# Patient Record
Sex: FEMALE | Race: WHITE
[De-identification: names, ages, dates, MRNs, and addresses within clinical notes are randomized per-mention and may not be internally consistent; named-entity substitution may affect disease eponyms.]

---

## 2017-10-05 ENCOUNTER — HOSPITAL ENCOUNTER (INPATIENT)
Dept: HOSPITAL 62 - ER | Age: 37
LOS: 3 days | Discharge: HOME | DRG: 690 | End: 2017-10-08
Attending: INTERNAL MEDICINE | Admitting: INTERNAL MEDICINE
Payer: MEDICAID

## 2017-10-05 DIAGNOSIS — Z82.49: ICD-10-CM

## 2017-10-05 DIAGNOSIS — N20.0: ICD-10-CM

## 2017-10-05 DIAGNOSIS — N12: Primary | ICD-10-CM

## 2017-10-05 DIAGNOSIS — F17.210: ICD-10-CM

## 2017-10-05 DIAGNOSIS — Z86.73: ICD-10-CM

## 2017-10-05 DIAGNOSIS — B96.20: ICD-10-CM

## 2017-10-05 DIAGNOSIS — Z80.9: ICD-10-CM

## 2017-10-05 DIAGNOSIS — Z87.442: ICD-10-CM

## 2017-10-05 DIAGNOSIS — Z83.3: ICD-10-CM

## 2017-10-05 LAB
ADD ON TESTING BLD IN LAB: (no result)
ALBUMIN SERPL-MCNC: 4.6 G/DL (ref 3.5–5)
ALP SERPL-CCNC: 85 U/L (ref 38–126)
ALT SERPL-CCNC: 33 U/L (ref 9–52)
ANION GAP SERPL CALC-SCNC: 14 MMOL/L (ref 5–19)
APPEARANCE UR: (no result)
AST SERPL-CCNC: 18 U/L (ref 14–36)
BASOPHILS NFR BLD MANUAL: 0 % (ref 0–2)
BILIRUB DIRECT SERPL-MCNC: 0.3 MG/DL (ref 0–0.4)
BILIRUB SERPL-MCNC: 0.4 MG/DL (ref 0.2–1.3)
BILIRUB UR QL STRIP: NEGATIVE
BUN SERPL-MCNC: 12 MG/DL (ref 7–20)
CALCIUM: 10.2 MG/DL (ref 8.4–10.2)
CHLORIDE SERPL-SCNC: 102 MMOL/L (ref 98–107)
CO2 SERPL-SCNC: 26 MMOL/L (ref 22–30)
CREAT SERPL-MCNC: 0.78 MG/DL (ref 0.52–1.25)
EOSINOPHIL NFR BLD MANUAL: 0 % (ref 0–6)
ERYTHROCYTE [DISTWIDTH] IN BLOOD BY AUTOMATED COUNT: 13.1 % (ref 11.5–14)
GLUCOSE SERPL-MCNC: 88 MG/DL (ref 75–110)
GLUCOSE UR STRIP-MCNC: NEGATIVE MG/DL
HCT VFR BLD CALC: 40.1 % (ref 36–47)
HGB BLD-MCNC: 13.9 G/DL (ref 12–15.5)
HGB HCT DIFFERENCE: 1.6
KETONES UR STRIP-MCNC: (no result) MG/DL
MCH RBC QN AUTO: 31.1 PG (ref 27–33.4)
MCHC RBC AUTO-ENTMCNC: 34.7 G/DL (ref 32–36)
MCV RBC AUTO: 90 FL (ref 80–97)
NEUTS BAND NFR BLD MANUAL: 1 % (ref 3–5)
NITRITE UR QL STRIP: NEGATIVE
PH UR STRIP: 5 [PH] (ref 5–9)
POTASSIUM SERPL-SCNC: 4 MMOL/L (ref 3.6–5)
PROT SERPL-MCNC: 7.5 G/DL (ref 6.3–8.2)
PROT UR STRIP-MCNC: 30 MG/DL
RBC # BLD AUTO: 4.48 10^6/UL (ref 3.72–5.28)
RBC MORPH BLD: (no result)
SODIUM SERPL-SCNC: 141.6 MMOL/L (ref 137–145)
SP GR UR STRIP: 1.01
TOTAL CELLS COUNTED BLD: 100
TOXIC GRANULES BLD QL SMEAR: (no result)
UROBILINOGEN UR-MCNC: NEGATIVE MG/DL (ref ?–2)
VARIANT LYMPHS NFR BLD MANUAL: 3 % (ref 13–45)
WBC # BLD AUTO: 22 10^3/UL (ref 4–10.5)
WBC TOXIC VACUOLES BLD QL SMEAR: PRESENT

## 2017-10-05 PROCEDURE — 85025 COMPLETE CBC W/AUTO DIFF WBC: CPT

## 2017-10-05 PROCEDURE — 87086 URINE CULTURE/COLONY COUNT: CPT

## 2017-10-05 PROCEDURE — 81001 URINALYSIS AUTO W/SCOPE: CPT

## 2017-10-05 PROCEDURE — 84703 CHORIONIC GONADOTROPIN ASSAY: CPT

## 2017-10-05 PROCEDURE — 87040 BLOOD CULTURE FOR BACTERIA: CPT

## 2017-10-05 PROCEDURE — 80076 HEPATIC FUNCTION PANEL: CPT

## 2017-10-05 PROCEDURE — 74000: CPT

## 2017-10-05 PROCEDURE — 87088 URINE BACTERIA CULTURE: CPT

## 2017-10-05 PROCEDURE — 99284 EMERGENCY DEPT VISIT MOD MDM: CPT

## 2017-10-05 PROCEDURE — 80048 BASIC METABOLIC PNL TOTAL CA: CPT

## 2017-10-05 PROCEDURE — 87186 SC STD MICRODIL/AGAR DIL: CPT

## 2017-10-05 PROCEDURE — 36415 COLL VENOUS BLD VENIPUNCTURE: CPT

## 2017-10-05 PROCEDURE — 76775 US EXAM ABDO BACK WALL LIM: CPT

## 2017-10-05 RX ADMIN — OXYCODONE AND ACETAMINOPHEN PRN TAB: 5; 325 TABLET ORAL at 15:59

## 2017-10-05 RX ADMIN — SODIUM CHLORIDE PRN ML: 9 INJECTION, SOLUTION INTRAVENOUS at 12:34

## 2017-10-05 RX ADMIN — OXYCODONE AND ACETAMINOPHEN PRN TAB: 5; 325 TABLET ORAL at 22:56

## 2017-10-05 RX ADMIN — OXYCODONE AND ACETAMINOPHEN PRN TAB: 5; 325 TABLET ORAL at 09:13

## 2017-10-05 RX ADMIN — ACETAMINOPHEN PRN MG: 325 TABLET ORAL at 12:35

## 2017-10-05 RX ADMIN — DOCUSATE SODIUM SCH MG: 100 CAPSULE, LIQUID FILLED ORAL at 17:32

## 2017-10-05 RX ADMIN — ENOXAPARIN SODIUM SCH MG: 40 INJECTION SUBCUTANEOUS at 09:14

## 2017-10-05 RX ADMIN — DOCUSATE SODIUM SCH MG: 100 CAPSULE, LIQUID FILLED ORAL at 09:13

## 2017-10-05 RX ADMIN — SODIUM CHLORIDE PRN ML: 9 INJECTION, SOLUTION INTRAVENOUS at 09:15

## 2017-10-05 NOTE — RADIOLOGY REPORT (SQ)
EXAM DESCRIPTION: 



KUB/ABDOMEN (SINGLE VIEW)



CLINICAL HISTORY: 



37 years, Female, passing left sided kidney stone?



COMPARISON:

December 9, 2016 radiograph



NUMBER OF VIEWS:

1



TECHNIQUE:

Routine abdomen radiograph technique.



LIMITATIONS:

None.



FINDINGS:



No bowel obstruction or free peritoneal gas. 10 mm calcified

stone probably in the left renal pelvis. No calcified stones

along the expected course of either ureter. Numerous probable

phleboliths in the lower pelvis bilaterally.



IMPRESSION:



10 mm calcified stone probably in the left renal pelvis.



No bowel obstruction or free peritoneal gas on this single view.

 



 2011 [employer]- All Rights Reserved

## 2017-10-05 NOTE — ER DOCUMENT REPORT
ED GI/





- General


Chief Complaint: Possible Kidney Stone


Stated Complaint: POSSIBLE KIDNEY STONE


Time Seen by Provider: 10/05/17 03:48


Notes: 


Patient is a 37-year-old female comes emergency department for chief complaint 

of sudden onset of left flank pain this evening.  She does have a history of 

kidney stones and has passed them in the past, she has also had kidney 

infections.  She denies fever, she states she vomited once.  She denies 

abdominal pain.  She denies any daily medications or surgeries.


TRAVEL OUTSIDE OF THE [place] IN LAST 30 DAYS: No





- Related Data


Allergies/Adverse Reactions: 


 





cinnamon [Cinnamon] Allergy (Unknown, Verified 05/10/16 17:23)


 











Past Medical History





- General


Information source: Patient





- Social History


Smoking Status: Never Smoker


Frequency of alcohol use: None


Drug Abuse: None


Lives with: Family


Family History: DM, Malignancy, Thyroid Disfunction


Patient has suicidal ideation: No


Patient has homicidal ideation: No


Renal/ Medical History: Reports: Hx Kidney Stones.  Denies: Hx Peritoneal 

Dialysis


Past Surgical History: Reports: Hx Kidney (Renal Surgery) - adrenal gland 

removal left





- Immunizations


Immunizations up to date: Yes


Hx Diphtheria, Pertussis, Tetanus Vaccination: Yes - less 5 yrs





Review of Systems





- Review of Systems


Constitutional: No symptoms reported


EENT: No symptoms reported


Cardiovascular: No symptoms reported


Respiratory: No symptoms reported


Gastrointestinal: See HPI


Genitourinary: See HPI


Female Genitourinary: See HPI


Musculoskeletal: No symptoms reported


Skin: No symptoms reported


Hematologic/Lymphatic: No symptoms reported


Neurological/Psychological: No symptoms reported





Physical Exam





- Vital signs


Vitals: 


 











Temp Pulse Resp BP Pulse Ox


 


 98.1 F   125 H  22 H  131/74 H  97 


 


 10/05/17 03:38  10/05/17 03:38  10/05/17 03:38  10/05/17 03:38  10/05/17 03:38











Interpretation: Normal





- General


General appearance: Alert, Anxious


In distress: Mild - Patient appears to be mildly uncomfortable although she 

does not appear to be in severe distress





- HEENT


Head: Normocephalic, Atraumatic


Eyes: Normal


Extraocular movements intact: Yes


Eyelashes: Normal


Pupils: PERRL


Mucous membranes: Normal


Pharynx: Normal


Neck: Normal





- Respiratory


Respiratory status: No respiratory distress


Chest status: Nontender


Breath sounds: Normal


Chest palpation: Normal





- Cardiovascular


Rhythm: Regular, Tachycardia


Heart sounds: Normal auscultation, S1 appreciated, S2 appreciated


Murmur: No





- Abdominal


Inspection: Normal


Distension: No distension


Bowel sounds: Normal


Tenderness: Nontender.  No: Tender, Guarding


Organomegaly: No organomegaly





- Back


Back: Tender - Minimal tenderness over the left CVA area, no overt tenderness





- Extremities


General upper extremity: Normal inspection, Nontender, Normal color, Normal ROM

, Normal temperature


General lower extremity: Normal inspection, Nontender, Normal color, Normal ROM

, Normal temperature, Normal weight bearing.  No: Shabnam's sign





- Neurological


Neuro grossly intact: Yes


Cognition: Normal


Orientation: AAOx4


[place] Coma Scale Eye Opening: Spontaneous


[place] Coma Scale Verbal: Oriented


Estrella Coma Scale Motor: Obeys Commands


Estrella Coma Scale Total: 15


Speech: Normal


Motor strength normal: LUE, RUE, LLE, RLE


Sensory: Normal





- Psychological


Associated symptoms: Normal affect, Normal mood





- Skin


Skin Temperature: Warm


Skin Moisture: Dry


Skin Color: Normal





Course





- Re-evaluation


Re-evalutation: 


Patient uncomfortable on initial evaluation but does not appear to be in severe 

distress.  She is only vomited once.  Abdominal and flank examination is 

unremarkable.  Patient is tachycardic but she is not febrile or hypotensive.  

Workup pending.





KUB performed, compared to prior shows no significant change in the location of 

the stone, this stone has been in this location for at least 2 years per 

imaging performed in this department.  Leukocytosis at 22,000 with elevation of 

neutrophils, Urinalysis indicates infection, workup is consistent with 

pyelonephritis which patient has had repeatedly with this stone in the left 

kidney.  We do not have urology coverage for 5 more days.





10/05/17 06:49


Spoke with Dr. Zambrano, [facility], he recommends that 

patient be admitted to the hospital under hospitalist service, he states that 

after patient has been treated for the pyelonephritis she can follow up in 

about 2 weeks with Urology and that she must follow up with Urology at that 

point.  Discussed this with patient and she states agreement.





Discussed with Dr. Gregory, patient will be admitted to the hospital. 








- Vital Signs


Vital signs: 


 











Temp Pulse Resp BP Pulse Ox


 


 98.3 F   111 H  18   114/60   94 


 


 10/05/17 06:11  10/05/17 06:11  10/05/17 06:11  10/05/17 06:11  10/05/17 06:11














- Laboratory


Result Diagrams: 


 10/05/17 04:10





 10/05/17 04:10


Laboratory results interpreted by me: 


 











  10/05/17 10/05/17





  04:10 04:10


 


WBC  22.0 H 


 


Seg Neuts % (Manual)  82 H 


 


Band Neutrophils %  1 L 


 


Lymphocytes % (Manual)  3 L 


 


Abs Neuts (Manual)  18.3 H 


 


Abs Monocytes (Manual)  2.9 H 


 


Urine Protein   30 H


 


Urine Ketones   TRACE H


 


Urine Blood   MODERATE H


 


Ur Leukocyte Esterase   MODERATE H














Discharge





- Discharge


Clinical Impression: 


 Pyelonephritis, Tachycardia





Leukocytosis


Qualifiers:


 Leukocytosis type: unspecified Qualified Code(s): D72.829 - Elevated white 

blood cell count, unspecified





Condition: Stable


Disposition: ADMITTED AS INPATIENT


Admitting Provider: Hospitalist


Unit Admitted: Telemetry

## 2017-10-05 NOTE — PDOC H&P
History of Present Illness


Admission Date/PCP: 


  10/05/17 07:39





  





Patient complains of: Back pain


History of Present Illness: 


KAMLESH GARCIA is a 37 year old female, with history of kidney stone in the 

past presents to the emergency room because of back pain started last night 

associated chills, nausea and vomiting.  There is no fever nor sweating.  No 

noted hematuria nor diarrhea.  Likewise there is no constipation nor vaginal 

bleeding nor drainage.  No melena hematochezia noted as well.  Patient went to 

the emergency room for evaluation WBC was elevated, KUB showed no obstruction, 

urinalysis was abnormal.  She had CVA tenderness on the left.  Urologist was 

consulted recommended treatment for pyelonephritis prior to any urologic 

intervention and therefore the patient was referred for admission.








Past Medical History


Renal/ Medical History: Reports: Nephrolithiasis





Past Surgical History


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





Social History


Information Source: Patient


Lives with: Family


Smoking Status: Current Some Day Smoker


Frequency of Alcohol Use: Rare


Hx Recreational Drug Use: No


Drugs: None





Family History


Family History: CVA, DM, Hypertension, Malignancy, Thyroid Disfunction, Other - 

Heart disease


Parental Family History Reviewed: Yes


Children Family History Reviewed: Yes


Sibling(s) Family History Reviewed.: Yes





Medication/Allergy


Home Medications: 








Levofloxacin [Levaquin 750 mg Tablet] 750 mg PO DAILY #7 tablet 05/10/16 


Oxycodone HCl/Acetaminophen [Percocet 5-325 mg Tablet] 1 - 2 tab PO Q4H PRN #15 

tablet 05/10/16 


Promethazine HCl [Phenergan 25 mg Tablet] 1 - 2 tab PO Q6H PRN #15 tablet 05/10/

16 


Ciprofloxacin HCl [Cipro 500 mg Tablet] 500 mg PO BID #20 tablet 12/09/16 


Hydrocodone/Acetaminophen [Norco 5-325 mg Tablet] 1 tab PO Q4 PRN #15 tablet 12/ 09/16 


Phenazopyridine HCl [Pyridium 100 Mg Tablet] 100 mg PO Q8 PRN #30 tablet 12/09/ 16 








Allergies/Adverse Reactions: 


 





cinnamon [Cinnamon] Allergy (Unknown, Verified 05/10/16 17:23)


 











Review of Systems


Constitutional: PRESENT: chills.  ABSENT: fever(s), headache(s), weight gain, 

weight loss


Eyes: ABSENT: visual disturbances


Ears: ABSENT: hearing changes


Cardiovascular: ABSENT: chest pain, dyspnea on exertion, edema, orthropnea, 

palpitations


Respiratory: ABSENT: cough, hemoptysis


Gastrointestinal: PRESENT: abdominal pain - Left flank, nausea, vomiting.  

ABSENT: coffee ground emesis, constipation, diarrhea, hematemesis, hematochezia

, melena


Genitourinary: ABSENT: difficulty urinating, dysuria, hematuria


Musculoskeletal: ABSENT: joint swelling


Integumentary: ABSENT: pruritus, rash, wounds


Neurological: ABSENT: abnormal gait, abnormal speech, confusion, dizziness, 

focal weakness, syncope


Psychiatric: ABSENT: anxiety, depression, homidical ideation, suicidal ideation


Endocrine: ABSENT: cold intolerance, heat intolerance, polydipsia, polyuria


Hematologic/Lymphatic: ABSENT: easy bleeding, easy bruising





Physical Exam


Vital Signs: 


 











Temp Pulse Resp BP Pulse Ox


 


 98.3 F   111 H  18   114/60   94 


 


 10/05/17 06:11  10/05/17 06:11  10/05/17 06:11  10/05/17 06:11  10/05/17 06:11











General appearance: PRESENT: no acute distress, obese


Head exam: PRESENT: atraumatic, normocephalic


Eye exam: PRESENT: conjunctiva pink, EOMI, PERRLA.  ABSENT: scleral icterus


Ear exam: PRESENT: normal external ear exam


Mouth exam: PRESENT: moist, neck supple, tongue midline


Neck exam: ABSENT: carotid bruit, JVD, lymphadenopathy, thyromegaly


Respiratory exam: PRESENT: clear to auscultation neeraj.  ABSENT: rales, rhonchi, 

wheezes


Cardiovascular exam: PRESENT: RRR.  ABSENT: diastolic murmur, rubs, systolic 

murmur


Pulses: PRESENT: normal dorsalis pedis pul


Vascular exam: PRESENT: normal capillary refill


GI/Abdominal exam: PRESENT: normal bowel sounds, soft, tenderness - CVA, noted 

on the left side.  ABSENT: distended, guarding, mass, organolmegaly, rebound


Rectal exam: PRESENT: deferred


Extremities exam: PRESENT: full ROM.  ABSENT: calf tenderness, clubbing, pedal 

edema


Neurological exam: PRESENT: alert, awake, oriented to person, oriented to place

, oriented to time, oriented to situation


Psychiatric exam: PRESENT: appropriate affect, normal mood.  ABSENT: homicidal 

ideation, suicidal ideation


Skin exam: PRESENT: dry, intact, warm.  ABSENT: cyanosis, rash





Results


Impressions: 


 





KUB X-Ray  10/05/17 05:12


IMPRESSION:


 


10 mm calcified stone probably in the left renal pelvis.


 


No bowel obstruction or free peritoneal gas on this single view.


 


 


 2011 [employer]- All Rights Reserved


 














Assessment & Plan





- Diagnosis


(1) Pyelonephritis


Is this a current diagnosis for this admission?: Yes   





(2) Nephrolithiasis


Is this a current diagnosis for this admission?: Yes   





- Time


Time Spent: 30 to 50 Minutes





- Inpatient Certification


Based on my medical assessment, after consideration of the patient's 

comorbidities, presenting symptoms, or acuity I expect that the services needed 

warrant INPATIENT care.: Yes


I certify that my determination is in accordance with my understanding of 

Medicare's requirements for reasonable and necessary INPATIENT services [42 CFR 

412.3e].: Yes


Medical Necessity: Need For IV Fluids, Need for IV Antibiotics


Post Hospital Care: D/C Planner Documentation





- Plan Summary


Plan Summary: 





Patient to be admitted to medical floor.  We will hydrate the patient with 

normal saline, dry on full liquid diet, culture the blood in the urine and 

begin intravenous antibiotic with Invanz.  We will obtain a renal ultrasound.  

DVT prophylaxis with Lovenox will be placed.  Further testing depends on the 

initial evaluations outlined above.

## 2017-10-05 NOTE — RADIOLOGY REPORT (SQ)
EXAM DESCRIPTION:  U/S RETROPERITON LTD



COMPLETED DATE/TIME:  10/5/2017 6:44 pm



REASON FOR STUDY:  hydronephrosis, back pain, kidney stone



COMPARISON:  None.



TECHNIQUE:  Dynamic and static grayscale images acquired of the kidneys and bladder and recorded on P
ACS. Additional selected color Doppler and spectral images recorded.



LIMITATIONS:  None.



FINDINGS:  RIGHT KIDNEY:  Normal size.   Normal echogenicity.   No solid or suspicious masses.   No h
ydronephrosis.   Small parenchymal calcifications.

LEFT KIDNEY:  Normal size.   Normal echogenicity.   No solid or suspicious masses.   No hydronephrosi
s.   Small parenchymal calcifications.

BLADDER: Not visualized due to bowel gas.

OTHER FINDINGS: Fatty infiltration of the liver.



IMPRESSION:  No hydronephrosis.  Bilateral nephrolithiasis.  Fatty liver.



TECHNICAL DOCUMENTATION:  JOB ID:  [id number]

 2011 [employer]- All Rights Reserved

## 2017-10-06 LAB
ANION GAP SERPL CALC-SCNC: 8 MMOL/L (ref 5–19)
BASOPHILS # BLD AUTO: 0 10^3/UL (ref 0–0.2)
BASOPHILS NFR BLD AUTO: 0.4 % (ref 0–2)
BUN SERPL-MCNC: 15 MG/DL (ref 7–20)
CALCIUM: 8.6 MG/DL (ref 8.4–10.2)
CHLORIDE SERPL-SCNC: 103 MMOL/L (ref 98–107)
CO2 SERPL-SCNC: 25 MMOL/L (ref 22–30)
CREAT SERPL-MCNC: 0.8 MG/DL (ref 0.52–1.25)
EOSINOPHIL # BLD AUTO: 0.1 10^3/UL (ref 0–0.6)
EOSINOPHIL NFR BLD AUTO: 1 % (ref 0–6)
ERYTHROCYTE [DISTWIDTH] IN BLOOD BY AUTOMATED COUNT: 13.4 % (ref 11.5–14)
GLUCOSE SERPL-MCNC: 86 MG/DL (ref 75–110)
HCT VFR BLD CALC: 34 % (ref 36–47)
HGB BLD-MCNC: 11.3 G/DL (ref 12–15.5)
HGB HCT DIFFERENCE: -0.1
LYMPHOCYTES # BLD AUTO: 1.4 10^3/UL (ref 0.5–4.7)
LYMPHOCYTES NFR BLD AUTO: 12.1 % (ref 13–45)
MCH RBC QN AUTO: 30.2 PG (ref 27–33.4)
MCHC RBC AUTO-ENTMCNC: 33.2 G/DL (ref 32–36)
MCV RBC AUTO: 91 FL (ref 80–97)
MONOCYTES # BLD AUTO: 1.7 10^3/UL (ref 0.1–1.4)
MONOCYTES NFR BLD AUTO: 14.2 % (ref 3–13)
NEUTROPHILS # BLD AUTO: 8.4 10^3/UL (ref 1.7–8.2)
NEUTS SEG NFR BLD AUTO: 72.3 % (ref 42–78)
POTASSIUM SERPL-SCNC: 4.4 MMOL/L (ref 3.6–5)
RBC # BLD AUTO: 3.75 10^6/UL (ref 3.72–5.28)
SODIUM SERPL-SCNC: 136.2 MMOL/L (ref 137–145)
WBC # BLD AUTO: 11.7 10^3/UL (ref 4–10.5)

## 2017-10-06 RX ADMIN — ACETAMINOPHEN PRN MG: 325 TABLET ORAL at 02:39

## 2017-10-06 RX ADMIN — DOCUSATE SODIUM SCH MG: 100 CAPSULE, LIQUID FILLED ORAL at 10:23

## 2017-10-06 RX ADMIN — DOCUSATE SODIUM SCH MG: 100 CAPSULE, LIQUID FILLED ORAL at 18:33

## 2017-10-06 RX ADMIN — MORPHINE SULFATE PRN MG: 10 INJECTION INTRAMUSCULAR; INTRAVENOUS; SUBCUTANEOUS at 02:36

## 2017-10-06 RX ADMIN — MORPHINE SULFATE PRN MG: 10 INJECTION INTRAMUSCULAR; INTRAVENOUS; SUBCUTANEOUS at 07:30

## 2017-10-06 RX ADMIN — CYCLOBENZAPRINE HYDROCHLORIDE SCH MG: 10 TABLET, FILM COATED ORAL at 16:01

## 2017-10-06 RX ADMIN — ERTAPENEM SODIUM SCH GM: 1 INJECTION, POWDER, LYOPHILIZED, FOR SOLUTION INTRAMUSCULAR; INTRAVENOUS at 10:23

## 2017-10-06 RX ADMIN — ACETAMINOPHEN PRN MG: 325 TABLET ORAL at 08:42

## 2017-10-06 RX ADMIN — MORPHINE SULFATE PRN MG: 10 INJECTION INTRAMUSCULAR; INTRAVENOUS; SUBCUTANEOUS at 00:13

## 2017-10-06 RX ADMIN — ENOXAPARIN SODIUM SCH MG: 40 INJECTION SUBCUTANEOUS at 10:23

## 2017-10-06 RX ADMIN — ACETAMINOPHEN PRN MG: 325 TABLET ORAL at 19:58

## 2017-10-06 RX ADMIN — OXYCODONE HYDROCHLORIDE PRN MG: 5 TABLET ORAL at 08:43

## 2017-10-06 NOTE — PDOC PROGRESS REPORT
Subjective


Progress Note for:: 10/06/17


Subjective:: 





Tolerating oral intake well. No reported chills nor fever. No diarrhea, N/V. No 

SOB. Back pain persist.





Physical Exam


Vital Signs: 


 











Temp Pulse Resp BP Pulse Ox


 


 97.7 F   70   19   121/55 L  98 


 


 10/06/17 03:50  10/06/17 03:50  10/06/17 03:50  10/06/17 03:50  10/06/17 03:50








 Intake & Output











 10/05/17 10/06/17 10/07/17





 06:59 06:59 06:59


 


Intake Total  1080 


 


Output Total  400 


 


Balance  680 


 


Weight  104.9 kg 











General appearance: PRESENT: no acute distress, cooperative


Head exam: PRESENT: normocephalic


Eye exam: PRESENT: EOMI


Mouth exam: PRESENT: moist, neck supple


Neck exam: ABSENT: JVD


Respiratory exam: PRESENT: clear to auscultation neeraj.  ABSENT: rhonchi, wheezes


Cardiovascular exam: PRESENT: RRR.  ABSENT: gallop


GI/Abdominal exam: PRESENT: soft.  ABSENT: distended


Extremities exam: ABSENT: pedal edema


Neurological exam: PRESENT: alert, awake, oriented to situation


Skin exam: PRESENT: dry, warm.  ABSENT: cyanosis





Results


Laboratory Results: 


 





 10/06/17 05:46 





 10/06/17 05:46 





 











  10/06/17 10/06/17





  05:46 05:46


 


WBC  11.7 H 


 


RBC  3.75 


 


Hgb  11.3 L D 


 


Hct  34.0 L 


 


MCV  91 


 


MCH  30.2 


 


MCHC  33.2 


 


RDW  13.4 


 


Plt Count  216 


 


Seg Neutrophils %  72.3 


 


Lymphocytes %  12.1 L 


 


Monocytes %  14.2 H 


 


Eosinophils %  1.0 


 


Basophils %  0.4 


 


Absolute Neutrophils  8.4 H 


 


Absolute Lymphocytes  1.4 


 


Absolute Monocytes  1.7 H 


 


Absolute Eosinophils  0.1 


 


Absolute Basophils  0.0 


 


Sodium   136.2 L


 


Potassium   4.4


 


Chloride   103


 


Carbon Dioxide   25


 


Anion Gap   8


 


BUN   15


 


Creatinine   0.80


 


Est GFR ( Amer)   > 60


 


Est GFR (Non-Af Amer)   > 60


 


Glucose   86


 


Calcium   8.6











Impressions: 


 





Renal Ultrasound  10/05/17 00:00


IMPRESSION:  No hydronephrosis.  Bilateral nephrolithiasis.  Fatty liver.


 








KUB X-Ray  10/05/17 05:12


IMPRESSION:


 


10 mm calcified stone probably in the left renal pelvis.


 


No bowel obstruction or free peritoneal gas on this single view.


 


 


 2011 [employer]- All Rights Reserved


 














Assessment & Plan





- Diagnosis


(1) Pyelonephritis


Is this a current diagnosis for this admission?: Yes   





(2) Nephrolithiasis


Is this a current diagnosis for this admission?: Yes   





- Time


Time Spent with patient: 25-34 minutes





- Plan Summary


Plan Summary: 





Continue antibiotics. Follow cultures. Increase oxycodone and morphine. Try 

flexeril. D/C ultram.

## 2017-10-07 RX ADMIN — ENOXAPARIN SODIUM SCH MG: 40 INJECTION SUBCUTANEOUS at 10:25

## 2017-10-07 RX ADMIN — OXYCODONE HYDROCHLORIDE PRN MG: 5 TABLET ORAL at 15:32

## 2017-10-07 RX ADMIN — SODIUM CHLORIDE PRN ML: 9 INJECTION, SOLUTION INTRAVENOUS at 15:36

## 2017-10-07 RX ADMIN — ACETAMINOPHEN PRN MG: 325 TABLET ORAL at 18:15

## 2017-10-07 RX ADMIN — OXYCODONE HYDROCHLORIDE PRN MG: 5 TABLET ORAL at 08:40

## 2017-10-07 RX ADMIN — ERTAPENEM SODIUM SCH GM: 1 INJECTION, POWDER, LYOPHILIZED, FOR SOLUTION INTRAMUSCULAR; INTRAVENOUS at 10:26

## 2017-10-07 RX ADMIN — DOCUSATE SODIUM SCH MG: 100 CAPSULE, LIQUID FILLED ORAL at 10:29

## 2017-10-07 RX ADMIN — CYCLOBENZAPRINE HYDROCHLORIDE SCH MG: 10 TABLET, FILM COATED ORAL at 01:54

## 2017-10-07 RX ADMIN — ACETAMINOPHEN PRN MG: 325 TABLET ORAL at 11:22

## 2017-10-07 RX ADMIN — DOCUSATE SODIUM SCH MG: 100 CAPSULE, LIQUID FILLED ORAL at 17:12

## 2017-10-07 RX ADMIN — OXYCODONE HYDROCHLORIDE PRN MG: 5 TABLET ORAL at 21:35

## 2017-10-07 RX ADMIN — OXYCODONE HYDROCHLORIDE PRN MG: 5 TABLET ORAL at 01:23

## 2017-10-07 NOTE — PDOC PROGRESS REPORT
Subjective


Progress Note for:: 10/07/17


Subjective:: 





Fever is coming down.  Patient able to tolerate oral intake better.  Denies 

having any diarrhea.  Back pain still present but less.





Physical Exam


Vital Signs: 


 











Temp Pulse Resp BP Pulse Ox


 


 97.8 F   74   18   109/57 L  97 


 


 10/07/17 03:15  10/07/17 03:15  10/07/17 03:15  10/07/17 03:15  10/07/17 03:15








 Intake & Output











 10/06/17 10/07/17 10/08/17





 06:59 06:59 06:59


 


Intake Total 1080 2479 


 


Output Total 400 3450 


 


Balance 680 -971 


 


Weight 104.9 kg 104.9 kg 











General appearance: PRESENT: no acute distress, cooperative, obese


Head exam: PRESENT: normocephalic


Eye exam: PRESENT: EOMI


Mouth exam: PRESENT: moist, neck supple


Neck exam: ABSENT: JVD


Respiratory exam: PRESENT: clear to auscultation neeraj


Cardiovascular exam: PRESENT: RRR


GI/Abdominal exam: PRESENT: normal bowel sounds, soft.  ABSENT: distended


Extremities exam: ABSENT: pedal edema


Neurological exam: PRESENT: alert, awake, oriented to situation





Results


Laboratory Results: 


 





 10/06/17 05:46 





 10/06/17 05:46 








Impressions: 


 





Renal Ultrasound  10/05/17 00:00


IMPRESSION:  No hydronephrosis.  Bilateral nephrolithiasis.  Fatty liver.


 








KUB X-Ray  10/05/17 05:12


IMPRESSION:


 


10 mm calcified stone probably in the left renal pelvis.


 


No bowel obstruction or free peritoneal gas on this single view.


 


 


 2011 [employer]- All Rights Reserved


 














Assessment & Plan





- Diagnosis


(1) Pyelonephritis


Is this a current diagnosis for this admission?: Yes   





(2) Nephrolithiasis


Is this a current diagnosis for this admission?: Yes   





- Time


Time Spent with patient: Less than 15 minutes





- Plan Summary


Plan Summary: 





Continue current antibiotics.  Follow cultures.  If patient continues to 

improve to be able to be discharged in the morning.

## 2017-10-08 VITALS — SYSTOLIC BLOOD PRESSURE: 121 MMHG | DIASTOLIC BLOOD PRESSURE: 55 MMHG

## 2017-10-08 RX ADMIN — DOCUSATE SODIUM SCH MG: 100 CAPSULE, LIQUID FILLED ORAL at 09:41

## 2017-10-08 RX ADMIN — ENOXAPARIN SODIUM SCH MG: 40 INJECTION SUBCUTANEOUS at 09:42

## 2017-10-08 RX ADMIN — ERTAPENEM SODIUM SCH GM: 1 INJECTION, POWDER, LYOPHILIZED, FOR SOLUTION INTRAMUSCULAR; INTRAVENOUS at 09:41

## 2017-10-08 RX ADMIN — OXYCODONE HYDROCHLORIDE PRN MG: 5 TABLET ORAL at 03:42

## 2017-10-08 RX ADMIN — OXYCODONE HYDROCHLORIDE PRN MG: 5 TABLET ORAL at 09:41

## 2017-10-08 NOTE — PDOC DISCHARGE SUMMARY
General





- Admit/Disc Date/PCP


Admission Date/Primary Care Provider: 


  10/05/17 08:40





  





Discharge Date: 10/08/17





- Discharge Diagnosis


(1) Pyelonephritis


Is this a current diagnosis for this admission?: Yes   





(2) Nephrolithiasis


Is this a current diagnosis for this admission?: Yes   





- Additional Information


Resuscitation Status: Full Code


Discharge Diet: Regular


Discharge Activity: Activity As Tolerated, Balance Activity w/Rest


Home Medications: 








Ciprofloxacin HCl [Cipro 500 mg Tablet] 500 mg PO BID #22 tablet 10/08/17 


Cyclobenzaprine HCl [Flexeril 5 mg Tablet] 5 mg PO TID PRN #15 tablet 10/08/17 


Oxycodone HCl/Acetaminophen [Percocet 5-325 mg Tablet] 1 tab PO TID PRN #15 tab 

10/08/17 








Additional Information: 





Return to the emergency room if symptoms recur





History of Present Illness


Patient complains of: Back pain


History of Present Illness: 


KAMLESH GARCIA is a 37 year old female, with history of kidney stone in the 

past presents to the emergency room because of back pain started last night 

associated chills, nausea and vomiting.  There is no fever nor sweating.  No 

noted hematuria nor diarrhea.  Likewise there is no constipation nor vaginal 

bleeding nor drainage.  No melena hematochezia noted as well.  Patient went to 

the emergency room for evaluation WBC was elevated, KUB showed no obstruction, 

urinalysis was abnormal.  She had CVA tenderness on the left.  Urologist was 

consulted recommended treatment for pyelonephritis prior to any urologic 

intervention and therefore the patient was referred for admission.








Hospital Course


Hospital Course: 





The patient was admitted to telemetry.  Patient was given intravenous fluids, 

broad-spectrum antibiotic with Invanz was started.  Analgesics and antipyretics 

as well as as needed antiemetics were given.  After 48 hours of treatment the 

patient's fever resolved and symptomatologies improved.  Urinary culture 

eventually grew E. coli that is pan sensitive.  Patient however still has 

intermittent back pain.  Renal ultrasound shows bilateral nephrolithiasis but 

no hydronephrosis were reported.  Patient was given muscle relaxant as well as 

analgesics and eventually the discomfort improved.  The rest of the hospital 

stays unremarkable.  Patient recommended to follow-up with a urologist in 2 

weeks regarding her kidney stones.





Physical Exam


Vital Signs: 


 











Temp Pulse Resp BP Pulse Ox


 


 98.2 F   72   12   121/55 L  97 


 


 10/08/17 10:49  10/08/17 10:49  10/08/17 10:49  10/08/17 10:49  10/08/17 10:49








 Intake & Output











 10/07/17 10/08/17 10/09/17





 06:59 06:59 06:59


 


Intake Total 2479 3952 


 


Output Total 3450 1700 


 


Balance -971 2252 


 


Weight 104.9 kg 104.9 kg 











General appearance: PRESENT: no acute distress, cooperative


Head exam: PRESENT: normocephalic


Eye exam: PRESENT: EOMI


Mouth exam: PRESENT: moist, neck supple


Neck exam: ABSENT: JVD


Respiratory exam: PRESENT: clear to auscultation neeraj.  ABSENT: rhonchi, wheezes


Cardiovascular exam: PRESENT: RRR


GI/Abdominal exam: PRESENT: normal bowel sounds, soft.  ABSENT: tenderness


Extremities exam: PRESENT: other - Trace pretibial edema


Neurological exam: PRESENT: alert, awake, oriented to situation


Skin exam: PRESENT: dry, warm.  ABSENT: cyanosis





Results


Laboratory Results: 


 





 10/06/17 05:46 





 10/06/17 05:46 








Impressions: 


 





Renal Ultrasound  10/05/17 00:00


IMPRESSION:  No hydronephrosis.  Bilateral nephrolithiasis.  Fatty liver.


 








KUB X-Ray  10/05/17 05:12


IMPRESSION:


 


10 mm calcified stone probably in the left renal pelvis.


 


No bowel obstruction or free peritoneal gas on this single view.


 


 


 2011 [employer]- All Rights Reserved


 














Qualifiers


**PATEINT BEING DISCHARGED WITH ANY OF THE FOLLOWING DIAGNOSIS?: No





Plan


Discharge Plan: 





Follow-up with primary care physician in 1 week.  Follow-up with urologist in 2 

weeks as scheduled.


Time Spent: Less than 30 Minutes

## 2018-07-01 ENCOUNTER — HOSPITAL ENCOUNTER (EMERGENCY)
Dept: HOSPITAL 62 - ER | Age: 38
Discharge: HOME | End: 2018-07-01
Payer: MEDICAID

## 2018-07-01 VITALS — SYSTOLIC BLOOD PRESSURE: 113 MMHG | DIASTOLIC BLOOD PRESSURE: 67 MMHG

## 2018-07-01 DIAGNOSIS — M79.1: ICD-10-CM

## 2018-07-01 DIAGNOSIS — R11.0: ICD-10-CM

## 2018-07-01 DIAGNOSIS — M54.5: ICD-10-CM

## 2018-07-01 DIAGNOSIS — R10.9: ICD-10-CM

## 2018-07-01 DIAGNOSIS — F17.210: ICD-10-CM

## 2018-07-01 DIAGNOSIS — N20.0: Primary | ICD-10-CM

## 2018-07-01 LAB
APPEARANCE UR: (no result)
APTT PPP: YELLOW S
BILIRUB UR QL STRIP: NEGATIVE
GLUCOSE UR STRIP-MCNC: NEGATIVE MG/DL
KETONES UR STRIP-MCNC: NEGATIVE MG/DL
NITRITE UR QL STRIP: NEGATIVE
PH UR STRIP: 5 [PH] (ref 5–9)
PROT UR STRIP-MCNC: NEGATIVE MG/DL
SP GR UR STRIP: 1.02
UROBILINOGEN UR-MCNC: NEGATIVE MG/DL (ref ?–2)

## 2018-07-01 PROCEDURE — 99284 EMERGENCY DEPT VISIT MOD MDM: CPT

## 2018-07-01 PROCEDURE — 96375 TX/PRO/DX INJ NEW DRUG ADDON: CPT

## 2018-07-01 PROCEDURE — 81025 URINE PREGNANCY TEST: CPT

## 2018-07-01 PROCEDURE — 96374 THER/PROPH/DIAG INJ IV PUSH: CPT

## 2018-07-01 PROCEDURE — 76380 CAT SCAN FOLLOW-UP STUDY: CPT

## 2018-07-01 PROCEDURE — 81001 URINALYSIS AUTO W/SCOPE: CPT

## 2018-07-01 NOTE — ER DOCUMENT REPORT
ED General





- General


Chief Complaint: Possible Kidney Stone


Stated Complaint: LOWER BACK PAIN


Time Seen by Provider: 07/01/18 12:31


Mode of Arrival: Ambulatory


Information source: Patient


Notes: 





38-year-old female history of kidney stones presents with complaints of left 

flank pain.  Patient notes symptoms started this morning.  Patient notes she 

has had at least one kidney stone past but has never passed.  She denies any 

fever chills admits to nausea


TRAVEL OUTSIDE OF THE [place] IN LAST 30 DAYS: No





- HPI


Onset: This morning


Onset/Duration: Sudden


Quality of pain: Sharp


Severity: Moderate


Pain Level: 4


Associated symptoms: Body/muscle aches


Exacerbated by: Movement


Relieved by: Denies


Similar symptoms previously: Yes


Recently seen / treated by doctor: Yes





- Related Data


Allergies/Adverse Reactions: 


 





cinnamon [Cinnamon] Allergy (Unknown, Verified 07/01/18 12:35)


 











Past Medical History





- Social History


Smoking Status: Current Every Day Smoker


Cigarette use (# per day): Yes


Chew tobacco use (# tins/day): No


Smoking Education Provided: No


Frequency of alcohol use: Occasional


Drug Abuse: None


Family History: CVA, DM, Hypertension, Malignancy, Thyroid Disfunction, Other - 

Heart disease


Patient has suicidal ideation: No


Patient has homicidal ideation: No


Renal/ Medical History: Reports: Hx Kidney Stones.  Denies: Hx Peritoneal 

Dialysis


Past Surgical History: Reports: Hx Kidney (Renal Surgery) - adrenal gland 

removal left, Other - Right adrenalectomy





- Immunizations


Immunizations up to date: Yes


Hx Diphtheria, Pertussis, Tetanus Vaccination: Yes - less 5 yrs





Review of Systems





- Review of Systems


Notes: 





REVIEW OF SYSTEMS:


CONSTITUTIONAL :  Denies fever,  chills, or sweats.  Denies recent illness.


EENT:   Denies eye, ear, throat, or mouth pain or symptoms.  Denies nasal or 

sinus congestion or discharge.  Denies throat, tongue, or mouth swelling or 

difficulty swallowing.


CARDIOVASCULAR:  Denies chest pain.  Denies palpitations or racing or irregular 

heart beat.  Denies ankle edema.


RESPIRATORY:  Denies cough, cold, or chest congestion.  Denies shortness of 

breath, difficulty breathing, or wheezing.


GASTROINTESTINAL: Admits left flank pain


GENITOURINARY:  Denies difficulty urinating, painful urination, burning, 

frequency, blood in urine, or discharge.


FEMALE  GENITOURINARY:  Denies vaginal bleeding, heavy or abnormal periods, 

irregular periods.  Denies vaginal discharge or odor. 


MUSCULOSKELETAL:  Denies back or neck pain or stiffness.  Denies joint pain or 

swelling.


SKIN:   Denies rash, lesions or sores.


HEMATOLOGIC :   Denies easy bruising or bleeding.


LYMPHATIC:  Denies swollen, enlarged glands.


NEUROLOGICAL:  Denies confusion or altered mental status.  Denies passing out 

or loss of consciousness.  Denies dizziness or lightheadedness.  Denies 

headache.  Denies weakness or paralysis or loss of use of either side.  Denies 

problems with gait or speech.  Denies sensory loss, numbness, or tingling.  

Denies seizures.


PSYCHIATRIC:  Denies anxiety or stress.  Denies depression, suicidal ideation, 

or homicidal ideation.





ALL OTHER SYSTEMS REVIEWED AND NEGATIVE.











PHYSICAL EXAMINATION:





GENERAL: Well-appearing, well-nourished and in acute distress.





HEAD: Atraumatic, normocephalic.





EYES: Pupils equal round and reactive to light, extraocular movements intact, 

conjunctiva are normal.





ENT: Nares patent, oropharynx clear without exudates.  Moist mucous membranes.





NECK: Normal range of motion, supple without lymphadenopathy





LUNGS: Breath sounds clear to auscultation bilaterally and equal.  No wheezes 

rales or rhonchi.





HEART: Regular rate and rhythm without murmurs





ABDOMEN: Soft, nontender, nondistended abdomen.  No guarding, no rebound.  No 

masses appreciated.





Female : deferred





Musculoskeletal: Normal range of motion, no pitting or edema.  No cyanosis.





NEUROLOGICAL: Cranial nerves grossly intact.  Normal speech, normal gait.  

Normal sensory, motor exams





PSYCH: Normal mood, normal affect.





SKIN: Warm, Dry, normal turgor, no rashes or lesions noted.

















Dictation was performed using Dragon voice recognition software





Physical Exam





- Vital signs


Vitals: 


 











Temp Pulse Resp BP Pulse Ox


 


 98.2 F   73   18   111/58 L  99 


 


 07/01/18 11:58  07/01/18 11:58  07/01/18 11:58  07/01/18 11:58  07/01/18 11:58














Course





- Re-evaluation


Re-evalutation: 





07/01/18 13:03


Patient noted to be in acute distress recommended fourth, urinalysis consistent 

with small amount of blood, CT was performed intrarenal stones are noted.





Patient immediately given pain control


07/01/18 13:27


Patient notes some relief wishes to be discharged home will give her follow-up 

with urology otherwise she is stable well-appearing no distress








After performing a Medical Screening Examination, I estimate there is LOW risk 

for ACUTE APPENDICITIS, BOWEL OBSTRUCTION, ACUTE CHOLECYSTITIS, PERFORATED 

DIVERTICULITIS, INCARCERATED HERNIA, PANCREATITIS, PELVIC INFLAMMATORY DISEASE, 

PERFORATED ULCER, ECTOPIC PREGNANCY, or TUBO-OVARIAN ABSCESS, thus I consider 

the discharge disposition reasonable. Also, there is no evidence or peritonitis

, sepsis, or toxicity. I have reevaluated this patient multiple times and no 

significant life threatening changes are noted. The patient and I have 

discussed the diagnosis and risks, and we agree with discharging home with 

close follow-up with the understanding that symptoms and presentations can 

change. We also discussed returning to the Emergency Department immediately if 

new or worsening symptoms occur. We have discussed the symptoms which are most 

concerning (e.g., bloody stool, fever, changing or worsening pain, vomiting) 

that necessitate immediate return.





- Vital Signs


Vital signs: 


 











Temp Pulse Resp BP Pulse Ox


 


 98.2 F   73   18   111/58 L  99 


 


 07/01/18 11:58  07/01/18 11:58  07/01/18 11:58  07/01/18 11:58  07/01/18 11:58














- Laboratory


Laboratory results interpreted by me: 


 











  07/01/18





  11:35


 


Urine Blood  SMALL H


 


Ur Leukocyte Esterase  SMALL H














- Diagnostic Test


Radiology reviewed: Image reviewed, Reports reviewed





Discharge





- Discharge


Clinical Impression: 


 Kidney stone on left side





Condition: Stable


Disposition: HOME, SELF-CARE


Instructions:  Kidney Stone ([facility])


Prescriptions: 


Ondansetron HCl [Zofran 8 mg Tablet] 8 mg PO Q8HP PRN #30 tablet


 PRN Reason: 


Oxycodone HCl/Acetaminophen [Percocet 5-325 mg Tablet] 1 - 2 tab PO Q4H PRN #20 

tablet


 PRN Reason: 


Tamsulosin HCl [Flomax 0.4 mg Cap.sr] 0.4 mg PO DAILY #7 cap.sr.24h


Referrals: 


[facility] [Provider Group] - Follow up as needed

## 2018-07-01 NOTE — RADIOLOGY REPORT (SQ)
EXAM DESCRIPTION:  CT LTD RENAL STONE PROTOCOL ON



COMPLETED DATE/TIME:  7/1/2018 12:23 pm



REASON FOR STUDY:  flank pain



COMPARISON:  Bilateral renal ultrasound 10/5/2017

CT abdomen pelvis without contrast 5/10/2016, 7/10/2015



TECHNIQUE:  CT scan of the abdomen and pelvis performed without intravenous or oral contrast. Images 
reviewed with lung, soft tissue, and bone windows. Reconstructed coronal and sagittal MPR images revi
ewed. All images stored on PACS.

All CT scanners at this facility use dose modulation, iterative reconstruction, and/or weight based d
osing when appropriate to reduce radiation dose to as low as reasonably achievable (ALARA).

[facility]: Dose Right  [facility]: CareDose    [facility]: Dose Right    [facility]: Teradose 4D    [facility]: Smart Technologies



RADIATION DOSE:  CT Rad equipment meets quality standard of care and radiation dose reduction techniq
ues were employed. CTDIvol: 13.9 mGy. DLP: 804 mGy-cm.mGy.



LIMITATIONS:  None.



FINDINGS:  LOWER CHEST: No significant findings. No nodules or infiltrates.

NON-CONTRASTED LIVER, SPLEEN, ADRENALS: Fatty normal size liver.  No splenomegaly.  No adrenal nodule
s

PANCREAS: Normal size.  No calcifications.

GALLBLADDER: No identified stones by CT criteria. No inflammatory changes to suggest cholecystitis.

RIGHT KIDNEY AND URETER: No suspicious masses. Assessment limited by lack of IV contrast.  2 cm cyst 
right upper pole kidney.  No significant calcifications.   No hydronephrosis or hydroureter.

LEFT KIDNEY AND URETER: No suspicious masses. Assessment limited by lack of IV contrast.   11 mm left
 midpole intrarenal nonobstructive stone, 900 Hounsfield units.  7 mm left lower pole intrarenal nono
bstructive stone, 600 Hounsfield units.   No hydronephrosis or hydroureter.

AORTA AND RETROPERITONEUM: No aneurysm. No retroperitoneal masses or adenopathy.  There are surgical 
clips in the left retroperitoneum between the left renal vein and splenic vein/pancreas.

BOWEL AND PERITONEAL CAVITY: No CT evidence of free intraperitoneal air or fluid.  No CT signs of bow
el obstruction.  Scattered colonic diverticuli without CT signs of acute diverticulitis.

APPENDIX: Normal.

PELVIS, BLADDER, AND ABDOMINAL WALL:No abnormal masses. No free fluid. Bladder normal.  Normal size f
emale pelvic organs.

BONES: No significant findings.

OTHER: No other significant finding.



IMPRESSION:  Left-sided intrarenal nonobstructive stones.

No right or left hydronephrosis or hydroureter.  No ureteral calculi.  No bladder stones.



COMMENT:  Quality ID # [id number]: Final reports with documentation of one or more dose reduction techniques
 (e.g., Automated exposure control, adjustment of the mA and/or kV according to patient size, use of 
iterative reconstruction technique)



TECHNICAL DOCUMENTATION:  JOB ID:  [id number]

 2011 [employer]- All Rights Reserved



Reading location - IP/workstation name: [email or web address]

## 2018-07-01 NOTE — ER DOCUMENT REPORT
Doctor's Note


Notes: 





07/01/18 12:45


38-year-old female history of kidney stones presents with complaints of left 

flank pain. Pt notes symptoms started this morning , denies any fevers or 

chills 








I have greeted and performed a rapid initial assessment of this patient.  A 

comprehensive ED assessment and evaluation of the patient, analysis of test 

results and completion of the medical decision making process will be conducted 

by additional ED providers.





PHYSICAL EXAMINATION:





GENERAL: Well-appearing, well-nourished and in acute distress.





HEAD: Atraumatic, normocephalic.





EYES: Pupils equal round extraocular movements intact,  conjunctiva are normal.





ENT: Nares patent





NECK: Normal range of motion





LUNGS: No respiratory distress





Musculoskeletal: Normal range of motion





NEUROLOGICAL:  Normal speech, normal gait. 





PSYCH: Normal mood, normal affect.





SKIN: Warm, Dry, normal turgor, no rashes or lesions noted.

## 2018-07-14 ENCOUNTER — HOSPITAL ENCOUNTER (EMERGENCY)
Dept: HOSPITAL 62 - ER | Age: 38
Discharge: HOME | End: 2018-07-14
Payer: MEDICAID

## 2018-07-14 VITALS — SYSTOLIC BLOOD PRESSURE: 120 MMHG | DIASTOLIC BLOOD PRESSURE: 69 MMHG
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
## 2018-07-14 DIAGNOSIS — S93.402A: Primary | ICD-10-CM

## 2018-07-14 DIAGNOSIS — F17.210: ICD-10-CM

## 2018-07-14 DIAGNOSIS — M25.572: ICD-10-CM
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
## 2018-07-14 PROCEDURE — L1902 AFO ANKLE GAUNTLET PRE OTS: HCPCS

## 2018-07-14 PROCEDURE — 99283 EMERGENCY DEPT VISIT LOW MDM: CPT

## 2018-07-14 PROCEDURE — L4350 ANKLE CONTROL ORTHO PRE OTS: HCPCS

## 2018-07-14 PROCEDURE — 73610 X-RAY EXAM OF ANKLE: CPT

## 2018-07-14 NOTE — RADIOLOGY REPORT (SQ)
EXAM DESCRIPTION:  ANKLE LEFT COMPLETE



COMPLETED DATE/TIME:  7/14/2018 11:57 am



REASON FOR STUDY:  pain, fell off ladder



COMPARISON:  1/16/2015



NUMBER OF VIEWS:  Three views.



TECHNIQUE:  AP, lateral, and oblique radiographic images acquired of the left ankle.



LIMITATIONS:  None.



FINDINGS:  MINERALIZATION: Normal.

BONES: No acute fracture or dislocation.  No worrisome bone lesions.

JOINTS: No effusions.

SOFT TISSUES: Lateral soft tissue swelling.

OTHER: No other significant finding.



IMPRESSION:  Lateral soft tissue swelling.  No acute fracture.



TECHNICAL DOCUMENTATION:  JOB ID:  [id number]

 2011 [employer]- All Rights Reserved



Reading location - IP/workstation name: [email or web address]

## 2018-07-14 NOTE — ER DOCUMENT REPORT
HPI





- HPI


Patient complains to provider of: left ankle pain


Onset: Yesterday


Onset/Duration: Sudden, Persistent


Quality of pain: Achy


Severity: Moderate


Pain Level: 3


Context: 





Patient presents emergency department with complaints of left ankle pain.  

Patient reports she was putting a trampoline together in her backyard when she 

stepped off a step stool and rolled her ankle.  Patient reports she basically 

crawled inside.  She reports increased pain today.  Obvious swelling to the 

left lateral ankle.  She reports she is taking Tylenol Motrin without relief of 

pain.  Warts no prior history of injury to the ankle.  No other complaints such 

as fever vomiting diarrhea.


Associated Symptoms: None


Exacerbated by: Movement, Walking


Relieved by: Denies


Similar symptoms previously: No


Recently seen / treated by doctor: No





- REPRODUCTIVE


Reproductive: DENIES: Pregnant:





- MUSCULOSKELETAL


Musculoskeletal: REPORTS: Extremity pain





Past Medical History





- General


Information source: Patient


Last Menstrual Period: 6/10/18





- Social History


Smoking Status: Current Every Day Smoker


Cigarette use (# per day): Yes


Chew tobacco use (# tins/day): No


Frequency of alcohol use: None


Drug Abuse: None


Lives with: Family


Family History: CVA, DM, Hypertension, Malignancy, Thyroid Disfunction, Other - 

Heart disease


Patient has suicidal ideation: No


Patient has homicidal ideation: No


Renal/ Medical History: Reports: Hx Kidney Stones.  Denies: Hx Peritoneal 

Dialysis


Past Surgical History: Reports: Hx Kidney (Renal Surgery) - adrenal gland 

removal left, Other - Right adrenalectomy





- Immunizations


Immunizations up to date: Yes


Hx Diphtheria, Pertussis, Tetanus Vaccination: Yes - less 5 yrs





Vertical Provider Document





- CONSTITUTIONAL


Agree With Documented VS: Yes


Exam Limitations: No Limitations


General Appearance: WD/WN, Mild Distress - winces with palpation to left 

lateral ankle





- INFECTION CONTROL


TRAVEL OUTSIDE OF THE [place] IN LAST 30 DAYS: No





- HEENT


HEENT: Atraumatic, Normocephalic





- NECK


Neck: Supple





- RESPIRATORY


Respiratory: Breath Sounds Normal, No Respiratory Distress





- CARDIOVASCULAR


Cardiovascular: Regular Rate





- MUSCULOSKELETAL/EXTREMETIES


Musculoskeletal/Extremeties: MAEW, FROM, Tender - Lateral ankle swollen tender 

to palpation good p.o. pulse able to flex and extend left foot





- NEURO


Level of Consciousness: Awake, Alert, Appropriate


Motor/Sensory: No Motor Deficit





- DERM


Integumentary: Warm, Dry


Adult Front & Back Diagram: 


  __________________________














  __________________________





 1 - swollen, ttp, +pedal pulse, good cap refill, flexes/extends foot without 

problem.








Course





- Re-evaluation


Re-evalutation: 





07/14/18 12:00


pt instructed 





- Vital Signs


Vital signs: 


 











Temp Pulse Resp BP Pulse Ox


 


 98.2 F   71   20   120/69   97 


 


 07/14/18 11:23  07/14/18 11:23  07/14/18 11:23  07/14/18 11:23  07/14/18 11:23














- Diagnostic Test


Radiology reviewed: Image reviewed, Reports reviewed - EXAM DESCRIPTION: ANKLE 

LEFT COMPLETE   COMPLETED DATE/TIME: 7/14/2018 11:57 am   REASON FOR STUDY: pain

, fell off ladder   COMPARISON: 1/16/2015   NUMBER OF VIEWS: Three views.   

TECHNIQUE: AP, lateral, and oblique radiographic images acquired of the left 

ankle.   LIMITATIONS: None.   FINDINGS: MINERALIZATION: Normal.  BONES: No 

acute fracture or dislocation. No worrisome bone lesions.  JOINTS: No 

effusions.  SOFT TISSUES: Lateral soft tissue swelling.  OTHER: No other 

significant finding.   IMPRESSION: Lateral soft tissue swelling. No acute 

fracture.





Procedures





- Immobilization


  ** Left Ankle


Pre-Proc Neuro Vasc Exam: Normal


Immobilizer type: Ankle stirrup


Performed by: PCT


Post-Proc Neuro Vasc Exam: Unchanged from pre-exam


Alignment checked and good: Yes





Discharge





- Discharge


Clinical Impression: 


Left ankle sprain


Qualifiers:


 Encounter type: initial encounter Involved ligament of ankle: unspecified 

ligament Qualified Code(s): S93.402A - Sprain of unspecified ligament of left 

ankle, initial encounter





Condition: Stable


Disposition: HOME, SELF-CARE


Instructions:  Ankle Stirrup Splint ([facility]), Ice Packs ([facility]), Oral Narcotic 

Medication ([facility]), Sprained Ankle ([facility])


Additional Instructions: 


*You have been evaluated for an ankle injury


*Rest/Ice/Elevate your ankle


*Maintain the splint and use your crutches for the next three days


*Follow up with orthopedics-call for an appointment


*Take medication as prescribed for pain


*Return to ED for worsening condition, changes, needs





Prescriptions: 


Oxycodone HCl/Acetaminophen [Percocet 5-325 mg Tablet] 1 tab PO ASDIR PRN #10 

tablet


 PRN Reason:

## 2019-02-27 ENCOUNTER — HOSPITAL ENCOUNTER (EMERGENCY)
Dept: HOSPITAL 62 - ER | Age: 39
Discharge: HOME | End: 2019-02-27
Payer: MEDICAID

## 2019-02-27 VITALS — SYSTOLIC BLOOD PRESSURE: 116 MMHG | DIASTOLIC BLOOD PRESSURE: 63 MMHG

## 2019-02-27 DIAGNOSIS — R05: ICD-10-CM

## 2019-02-27 DIAGNOSIS — F17.200: ICD-10-CM

## 2019-02-27 DIAGNOSIS — N20.0: Primary | ICD-10-CM

## 2019-02-27 DIAGNOSIS — Z91.018: ICD-10-CM

## 2019-02-27 DIAGNOSIS — R10.9: ICD-10-CM

## 2019-02-27 DIAGNOSIS — N39.0: ICD-10-CM

## 2019-02-27 DIAGNOSIS — R06.2: ICD-10-CM

## 2019-02-27 PROCEDURE — 87186 SC STD MICRODIL/AGAR DIL: CPT

## 2019-02-27 PROCEDURE — 74018 RADEX ABDOMEN 1 VIEW: CPT

## 2019-02-27 PROCEDURE — 81025 URINE PREGNANCY TEST: CPT

## 2019-02-27 PROCEDURE — 87088 URINE BACTERIA CULTURE: CPT

## 2019-02-27 PROCEDURE — 96372 THER/PROPH/DIAG INJ SC/IM: CPT

## 2019-02-27 PROCEDURE — 76775 US EXAM ABDO BACK WALL LIM: CPT

## 2019-02-27 PROCEDURE — 87086 URINE CULTURE/COLONY COUNT: CPT

## 2019-02-27 PROCEDURE — 71046 X-RAY EXAM CHEST 2 VIEWS: CPT

## 2019-02-27 PROCEDURE — 99284 EMERGENCY DEPT VISIT MOD MDM: CPT

## 2019-02-27 PROCEDURE — 81001 URINALYSIS AUTO W/SCOPE: CPT

## 2019-02-27 NOTE — ER DOCUMENT REPORT
ED GI/





- General


Chief Complaint: Flank Pain


Stated Complaint: FLANK PAIN


Time Seen by Provider: 02/27/19 03:51


Notes: 





38-year-old female patient emergency department chief complaint of left flank 

pain, cough and wheeze.  Patient states that she has a long-standing history of 

kidney stones.  Has been septic in the past from infection and obstructing 

kidney stone.  Patient did not want that to happen again.  Has had some pain in 

the left flank area.  No blood in the urine.  She has been coughing and 

generally not feeling well 


TRAVEL OUTSIDE OF THE [place] IN LAST 30 DAYS: No





- HPI


Patient complains to provider of: Flank pain


Timing/Duration: Gradual


Quality of pain: Achy


Severity at maximum: Moderate


Severity in ED: Moderate


Pain Level: 3


Location: Left flank





- Related Data


Allergies/Adverse Reactions: 


                                        





cinnamon [Cinnamon] Allergy (Unknown, Verified 07/01/18 12:35)


   











Past Medical History





- General


Information source: Patient





- Social History


Smoking Status: Current Every Day Smoker


Frequency of alcohol use: None


Drug Abuse: None


Lives with: Spouse/Significant other


Family History: CVA, DM, Hypertension, Malignancy, Thyroid Disfunction, Other - 

Heart disease


Renal/ Medical History: Reports: Hx Kidney Stones.  Denies: Hx Peritoneal 

Dialysis


Past Surgical History: Reports: Hx Kidney (Renal Surgery) - adrenal gland 

removal left, Other - Right adrenalectomy





- Immunizations


Immunizations up to date: Yes


Hx Diphtheria, Pertussis, Tetanus Vaccination: Yes - less 5 yrs





Review of Systems





- Review of Systems


Notes: 





Constitutional: denies: Chills, Diaphoresis, Fever, Malaise, Weakness





EENT: denies: Eye discharge, Blurred vision, Tearing, Double vision, Nose 

congestion, Nose discharge, Throat swelling, Mouth pain





Cardiovascular: denies: Palpitations, Heart racing, Orthopnea, Dyspnea, Chest 

pain





Respiratory: denies: Hurts to breathe, Wheezing, Shortness of breath.  Cough





Gastrointestinal: denies: Abdominal pain, Diarrhea, Nausea, Vomiting, Black 

stools, bright red blood in stool





Genitourinary: denies: Burning, Dysuria, Discharge, Frequency, Flank pain, 

Hematuria





Musculoskeletal:  denies: Joint pain, Joint swelling, Muscle pain, Muscle 

stiffness,.  Positive left flank/back pain





Hematologic/Lymphatic:  denies: Anemia, Easy bleeding, Easy bruising, Blood 

clots





Neurological/Psychological: denies: Confusion, Dementia, Depression, Loss of 

consciousness





Skin: No lesions, no masses, no skin breakdown, no abscesses





Physical Exam





- Vital signs


Vitals: 


                                        











Temp Pulse Resp BP Pulse Ox


 


 98.9 F   78   20   136/79 H  97 


 


 02/27/19 03:13  02/27/19 03:13  02/27/19 03:13  02/27/19 03:13  02/27/19 03:13














Course





- Re-evaluation


Re-evalutation: 





02/27/19 05:36


Urinalysis is consistent with a UTI but given the patient's history of kidney 

stones KUB was ordered.  KUB does show nephrolithiasis in the left kidney.  

Ultrasound has been ordered to rule out hydronephrosis.  Patient has been given 

Toradol, Bactrim, Norco and now requesting more pain meds.  We will also get a 

CBC at this time as well.


02/27/19 06:35





                                        





Chest X-Ray  02/27/19 04:24


IMPRESSION:


 


No acute cardiopulmonary findings.


 








KUB X-Ray  02/27/19 04:24


IMPRESSION:


 


No acute findings. Left nephrolithiasis.


 








Renal Ultrasound  02/27/19 05:35


IMPRESSION:


 


No acute findings. 1.2 cm left nephrolithiasis.


 








There is no evidence of hydronephrosis.  There is a visible stone seen on x-ray 

as well as ultrasound.  At this time we will continue to treat with pain 

medication and antibiotics.  Encourage close follow-up with urology and primary 

care doctor.  Patient verbalized understanding of these instructions.  Will 

discharge at this time in stable condition.





- Vital Signs


Vital signs: 


                                        











Temp Pulse Resp BP Pulse Ox


 


 98.3 F   89   17   129/64 H  97 


 


 02/27/19 04:07  02/27/19 04:07  02/27/19 04:07  02/27/19 04:07  02/27/19 04:07














- Laboratory


Laboratory results interpreted by me: 


                                        











  02/27/19





  04:27


 


Ur Leukocyte Esterase  L[facility]GE H














Discharge





- Discharge


Clinical Impression: 


 Nephrolithiasis





Urinary tract infection


Qualifiers:


 Urinary tract infection type: site unspecified Hematuria presence: without 

hematuria Qualified Code(s): N39.0 - Urinary tract infection, site not specified





Condition: Good


Disposition: HOME, SELF-CARE


Instructions:  Kidney Stone ([facility]), Urinary Tract Infection ([facility])


Additional Instructions: 


Please take the antibiotics as instructed.  Follow-up with your regular doctor. 

 Follow-up information for urologist has been provided as well.  It will be very

 important that you make your follow-up appointments.  In the event that you 

develop fever, worsening pain or worsening symptoms you should return as you do 

have a kidney stone and it does appear you have an infection and a kidney stone 

with an infection can be severe.  At this time I do not feel you have any 

obstructions so things should go well.


Prescriptions: 


Hydrocodone/Acetaminophen [Norco 5-325 mg Tablet] 1 tab PO TID PRN 3 Days #9 

tablet


 PRN Reason: 


Phenazopyridine HCl [Pyridium 100 Mg Tablet] 100 mg PO BID 3 Days #6 tablet


Sulfamethoxazole/Trimethoprim [Bactrim Ds Tablet] 1 each PO BID 10 Days #20 

tablet


Forms:  Return to Work


Referrals: 


[facility] [Provider Group] - Follow up in 3-5 days

## 2019-02-27 NOTE — RADIOLOGY REPORT (SQ)
EXAM DESCRIPTION:

XR CHEST 2 VIEWS



COMPLETED DATE/TME:  02/27/2019 04:24



CLINICAL HISTORY:

38 years Female, left flank pain, hx of stones



COMPARISON:01/26/2016



NUMBER OF VIEWS/TECHNIQUE:

2, Frontal, Lateral



FINDINGS:



Adequate lung volume, clear parenchyma, normal cardiac

silhouette, and intact bony thorax.



IMPRESSION:



No acute cardiopulmonary findings.

## 2019-02-27 NOTE — RADIOLOGY REPORT (SQ)
EXAM DESCRIPTION:

XR ABDOMEN 1 VIEW (KUB)



COMPLETED DATE/TME:  02/27/2019 04:24



CLINICAL HISTORY:

38 years Female, left flank pain, hx of stones



COMPARISON:

October 5, 2017. CT, July 1, 2018.



NUMBER OF VIEWS/TECHNIQUE:

2



FINDINGS:



Intestinal gas pattern is within normal limits. Paucity of bowel

gas. Known left nephrolithiasis measuring up to 1.1 cm each.

Stable. Grossly intact skeletal structures. 



IMPRESSION:



No acute findings. Left nephrolithiasis.

## 2019-02-27 NOTE — RADIOLOGY REPORT (SQ)
EXAM DESCRIPTION:

US RETROPERITONEUM LIMITED



COMPLETED DATE/TME:  02/27/2019 05:35



CLINICAL HISTORY:

38 years Female, left flank pain



Comparison: CR, same day.



LIMITATIONS: None.



FINDINGS:



12-cm right kidney, 12-cm left kidney including a 1.2 cm left

renal stone, and urinary bladder appear otherwise of normal size,

shape, echotexture, and vascularity.



IMPRESSION:



No acute findings. 1.2 cm left nephrolithiasis.

## 2019-09-09 ENCOUNTER — HOSPITAL ENCOUNTER (INPATIENT)
Dept: HOSPITAL 62 - ER | Age: 39
LOS: 4 days | Discharge: HOME | DRG: 694 | End: 2019-09-13
Attending: INTERNAL MEDICINE | Admitting: INTERNAL MEDICINE
Payer: MEDICAID

## 2019-09-09 DIAGNOSIS — D72.829: ICD-10-CM

## 2019-09-09 DIAGNOSIS — E89.6: ICD-10-CM

## 2019-09-09 DIAGNOSIS — F17.200: ICD-10-CM

## 2019-09-09 DIAGNOSIS — E66.9: ICD-10-CM

## 2019-09-09 DIAGNOSIS — R60.9: ICD-10-CM

## 2019-09-09 DIAGNOSIS — Z87.442: ICD-10-CM

## 2019-09-09 DIAGNOSIS — N20.0: Primary | ICD-10-CM

## 2019-09-09 DIAGNOSIS — Z91.018: ICD-10-CM

## 2019-09-09 DIAGNOSIS — N39.0: ICD-10-CM

## 2019-09-09 DIAGNOSIS — R51: ICD-10-CM

## 2019-09-09 LAB
ADD MANUAL DIFF: NO
ALBUMIN SERPL-MCNC: 3.8 G/DL (ref 3.5–5)
ALP SERPL-CCNC: 77 U/L (ref 38–126)
ANION GAP SERPL CALC-SCNC: 7 MMOL/L (ref 5–19)
APPEARANCE UR: (no result)
APTT PPP: YELLOW S
AST SERPL-CCNC: 15 U/L (ref 14–36)
BASOPHILS # BLD AUTO: 0.1 10^3/UL (ref 0–0.2)
BASOPHILS NFR BLD AUTO: 0.3 % (ref 0–2)
BILIRUB DIRECT SERPL-MCNC: 0.1 MG/DL (ref 0–0.4)
BILIRUB SERPL-MCNC: 0.2 MG/DL (ref 0.2–1.3)
BILIRUB UR QL STRIP: NEGATIVE
BUN SERPL-MCNC: 19 MG/DL (ref 7–20)
CALCIUM: 9.1 MG/DL (ref 8.4–10.2)
CHLORIDE SERPL-SCNC: 103 MMOL/L (ref 98–107)
CO2 SERPL-SCNC: 29 MMOL/L (ref 22–30)
EOSINOPHIL # BLD AUTO: 0.2 10^3/UL (ref 0–0.6)
EOSINOPHIL NFR BLD AUTO: 0.9 % (ref 0–6)
ERYTHROCYTE [DISTWIDTH] IN BLOOD BY AUTOMATED COUNT: 13.4 % (ref 11.5–14)
GLUCOSE SERPL-MCNC: 100 MG/DL (ref 75–110)
GLUCOSE UR STRIP-MCNC: NEGATIVE MG/DL
HCT VFR BLD CALC: 39.1 % (ref 36–47)
HGB BLD-MCNC: 12.8 G/DL (ref 12–15.5)
KETONES UR STRIP-MCNC: NEGATIVE MG/DL
LYMPHOCYTES # BLD AUTO: 2.3 10^3/UL (ref 0.5–4.7)
LYMPHOCYTES NFR BLD AUTO: 12.3 % (ref 13–45)
MCH RBC QN AUTO: 29.6 PG (ref 27–33.4)
MCHC RBC AUTO-ENTMCNC: 32.8 G/DL (ref 32–36)
MCV RBC AUTO: 90 FL (ref 80–97)
MONOCYTES # BLD AUTO: 0.8 10^3/UL (ref 0.1–1.4)
MONOCYTES NFR BLD AUTO: 4 % (ref 3–13)
NEUTROPHILS # BLD AUTO: 15.7 10^3/UL (ref 1.7–8.2)
NEUTS SEG NFR BLD AUTO: 82.5 % (ref 42–78)
NITRITE UR QL STRIP: NEGATIVE
PH UR STRIP: 6 [PH] (ref 5–9)
PLATELET # BLD: 264 10^3/UL (ref 150–450)
POTASSIUM SERPL-SCNC: 3.9 MMOL/L (ref 3.6–5)
PROT SERPL-MCNC: 6.4 G/DL (ref 6.3–8.2)
PROT UR STRIP-MCNC: NEGATIVE MG/DL
RBC # BLD AUTO: 4.33 10^6/UL (ref 3.72–5.28)
SP GR UR STRIP: 1.02
TOTAL CELLS COUNTED % (AUTO): 100 %
UROBILINOGEN UR-MCNC: NEGATIVE MG/DL (ref ?–2)
WBC # BLD AUTO: 19 10^3/UL (ref 4–10.5)

## 2019-09-09 PROCEDURE — 80053 COMPREHEN METABOLIC PANEL: CPT

## 2019-09-09 PROCEDURE — 99285 EMERGENCY DEPT VISIT HI MDM: CPT

## 2019-09-09 PROCEDURE — 85025 COMPLETE CBC W/AUTO DIFF WBC: CPT

## 2019-09-09 PROCEDURE — 87040 BLOOD CULTURE FOR BACTERIA: CPT

## 2019-09-09 PROCEDURE — S0119 ONDANSETRON 4 MG: HCPCS

## 2019-09-09 PROCEDURE — 96376 TX/PRO/DX INJ SAME DRUG ADON: CPT

## 2019-09-09 PROCEDURE — 96375 TX/PRO/DX INJ NEW DRUG ADDON: CPT

## 2019-09-09 PROCEDURE — 96361 HYDRATE IV INFUSION ADD-ON: CPT

## 2019-09-09 PROCEDURE — 84703 CHORIONIC GONADOTROPIN ASSAY: CPT

## 2019-09-09 PROCEDURE — 87086 URINE CULTURE/COLONY COUNT: CPT

## 2019-09-09 PROCEDURE — 81001 URINALYSIS AUTO W/SCOPE: CPT

## 2019-09-09 PROCEDURE — 36415 COLL VENOUS BLD VENIPUNCTURE: CPT

## 2019-09-09 PROCEDURE — 80048 BASIC METABOLIC PNL TOTAL CA: CPT

## 2019-09-09 PROCEDURE — 83690 ASSAY OF LIPASE: CPT

## 2019-09-09 PROCEDURE — 96374 THER/PROPH/DIAG INJ IV PUSH: CPT

## 2019-09-09 PROCEDURE — 83605 ASSAY OF LACTIC ACID: CPT

## 2019-09-09 PROCEDURE — 74176 CT ABD & PELVIS W/O CONTRAST: CPT

## 2019-09-09 RX ADMIN — Medication SCH: at 21:21

## 2019-09-09 RX ADMIN — MORPHINE SULFATE PRN MG: 10 INJECTION INTRAMUSCULAR; INTRAVENOUS; SUBCUTANEOUS at 22:17

## 2019-09-09 RX ADMIN — FAMOTIDINE SCH MG: 20 TABLET, FILM COATED ORAL at 21:18

## 2019-09-09 RX ADMIN — IBUPROFEN PRN MG: 600 TABLET, FILM COATED ORAL at 21:18

## 2019-09-09 RX ADMIN — AMPICILLIN SODIUM AND SULBACTAM SODIUM SCH MLS/HR: 2; 1 INJECTION, POWDER, FOR SOLUTION INTRAMUSCULAR; INTRAVENOUS at 21:18

## 2019-09-09 NOTE — RADIOLOGY REPORT (SQ)
EXAM DESCRIPTION:  CT ABD/PELVIS NO ORAL OR IV



COMPLETED DATE/TIME:  9/9/2019 11:25 am



REASON FOR STUDY:  left flank pain



COMPARISON:  4/4/2012



TECHNIQUE:  CT scan of the abdomen and pelvis performed without intravenous or oral contrast. Images 
reviewed with lung, soft tissue, and bone windows. Reconstructed coronal and sagittal MPR images revi
ewed. All images stored on PACS.

All CT scanners at this facility use dose modulation, iterative reconstruction, and/or weight based d
osing when appropriate to reduce radiation dose to as low as reasonably achievable (ALARA).

[facility]: Dose Right  [facility]: CareDose    [facility]: Dose Right    [facility]: Teradose 4D    [facility]: Smart XM Radio



RADIATION DOSE:  CT Rad equipment meets quality standard of care and radiation dose reduction techniq
ues were employed. CTDIvol: 12.9 mGy. DLP: 728 mGy-cm.mGy.



LIMITATIONS:  None.



FINDINGS:  LOWER CHEST: No significant findings. No nodules or infiltrates.

NON-CONTRASTED LIVER, SPLEEN, ADRENALS: Evaluation limited by lack of IV contrast. No identified sign
ificant masses.

PANCREAS: No masses. No peripancreatic inflammatory changes.

GALLBLADDER: No calcified stones. No inflammatory changes to suggest cholecystitis.

RIGHT KIDNEY AND URETER: Small cysts identified. No solid masses. No calcified stones. No hydronephro
sis or hydroureter.

LEFT KIDNEY AND URETER: Small cysts identified. No solid masses.  9 mm and 6 mm left lower pole paren
chymal- calyx calcified stones.  No hydronephrosis or hydroureter.

AORTA AND RETROPERITONEUM: No aneurysm. No retroperitoneal masses or adenopathy.

BOWEL AND PERITONEAL CAVITY: No obvious masses or inflammatory changes. No free fluid.

APPENDIX: Normal.

PELVIS, BLADDER, AND ABDOMINAL WALL:No abnormal masses. Trace free fluid. Unremarkable bladder.

BONES: No acute findings.

OTHER: No other significant finding.



IMPRESSION:  NO ACUTE FINDINGS.  Left nephrolithiasis.   No hydronephrosis or hydroureter..



TECHNICAL DOCUMENTATION:  JOB ID:  [id number]

[id number]

Quality ID # [id number]: Final reports with documentation of one or more dose reduction techniques (e.g., Au
tomated exposure control, adjustment of the mA and/or kV according to patient size, use of iterative 
reconstruction technique)

 2011 [employer]- All Rights Reserved



Reading location - IP/workstation name: [employer]

## 2019-09-09 NOTE — ER DOCUMENT REPORT
ED Medical Screen (RME)





- General


Chief Complaint: Possible Kidney Stone


Stated Complaint: FLANK PAIN


Time Seen by Provider: 09/09/19 11:03


Mode of Arrival: Ambulatory


Information source: Patient


Notes: 





39-year-old female presents to ED for left flank pain.  She already has a CBC 

done before I saw her 19,000.  She had a history of kidney stones.  I have 

ordered a CT and pain medicine.  She is alert oriented respirations regular 

nonlabored walking with a even steady gait.











I have greeted and performed a rapid initial assessment of this patient.  A 

comprehensive ED assessment and evaluation of the patient, analysis of test 

results and completion of medical decision making process will be conducted by 

an additional ED providers.


TRAVEL OUTSIDE OF THE [place] IN LAST 30 DAYS: No





- Related Data


Allergies/Adverse Reactions: 


                                        





cinnamon [Cinnamon] Allergy (Unknown, Verified 09/09/19 09:08)


   











Past Medical History





- Social History


Chew tobacco use (# tins/day): No


Frequency of alcohol use: Occasional


Drug Abuse: None





- Past Medical History


Cardiac Medical History: Reports: None


Pulmonary Medical History: Reports: None


EENT Medical History: Reports: None


Neurological Medical History: Reports: None


Endocrine Medical History: Reports: None


Renal/ Medical History: Reports: Hx Kidney Stones


Malignancy Medical History: Reports: None


GI Medical History: Reports: None


Musculoskeltal Medical History: Reports Hx Musculoskeletal Trauma


Skin Medical History: Reports None


Psychiatric Medical History: Reports: None


Traumatic Medical History: Reports: Hx Fractures - right arm left leg


Infectious Medical History: Reports: None


Past Surgical History: Reports: Hx Kidney (Renal Surgery) - adrenal gland 

removal left, Other - Right adrenalectomy





- Immunizations


Immunizations up to date: Yes


Hx Diphtheria, Pertussis, Tetanus Vaccination: Yes - less 5 yrs


History of Influenza Vaccine for 10/2017 - 3/2018 Season: Refused





Physical Exam





- Vital signs


Vitals: 





                                        











Temp Pulse Resp BP Pulse Ox


 


 97.6 F   78   20   133/83 H  99 


 


 09/09/19 09:10  09/09/19 09:10  09/09/19 09:10  09/09/19 09:10  09/09/19 09:10














Course





- Vital Signs


Vital signs: 





                                        











Temp Pulse Resp BP Pulse Ox


 


 97.6 F   78   20   133/83 H  99 


 


 09/09/19 09:10  09/09/19 09:10  09/09/19 09:10  09/09/19 09:10  09/09/19 09:10














- Laboratory


Result Diagrams: 


                                 09/09/19 10:18





                                 09/09/19 10:18


Laboratory results interpreted by me: 





                                        











  09/09/19





  10:18


 


WBC  19.0 H


 


Lymph % (Auto)  12.3 L


 


Absolute Neuts (auto)  15.7 H


 


Seg Neutrophils %  82.5 H

## 2019-09-09 NOTE — PDOC H&P
History of Present Illness


Admission Date/PCP: 


  09/09/19 15:44





  





Patient complains of: Left flank pain


History of Present Illness: 


KAMLESH GARCIA is a 39 year old female with history of renal calculi on the 

left.  She has never had lithotripsy.  She has never seen urologist.  She woke 

up this morning with significant left flank pain.  Did not improve and after she

took her son to school the pain was bad enough that she presented to the 

emergency department.  The patient states that when she got to the emergency 

department in addition to the pain she began to experience chills, fever and 

diaphoresis.  Her white blood cell count was 19,000, urinalysis suggested 

infection but unfortunately no urine culture was obtained.  She was started on 

antibiotic therapy and referred to the hospital service for admission.





Past Medical History


Cardiac Medical History: Reports: None


Pulmonary Medical History: Reports: None


EENT Medical History: Reports: None


Neurological Medical History: Reports: None


Endocrine Medical History: Reports: None


Renal/ Medical History: Reports: Nephrolithiasis, Other - History of several 

stones in the past.


Malignancy Medical History: Reports: None


GI Medical History: Reports: None


Musculoskeltal Medical History: 


   Denies: Fibromyalgia, Gout


Skin Medical History: Reports: None


Psychiatric Medical History: Reports: None


Traumatic Medical History: Reports: None


Hematology: Reports: None


Infectious Medical History: Reports: None





Past Surgical History


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





Social History


Information Source: Patient


Lives with: Family - Patient is  and has 2 children.  She works as a 

.


Smoking Status: Current Some Day Smoker


Frequency of Alcohol Use: Occasional


Hx Recreational Drug Use: No


Drugs: None


Hx Prescription Drug Abuse: No





- Advance Directive


Resuscitation Status: Full Code


Surrogate healthcare decision maker:: 





Her  would be th decision maker.  No healthcare proxy completed at this 

time.





Family History


Family History: CVA, DM, Hypertension, Malignancy, Thyroid Disfunction, Other - 

Heart disease, alcoholism


Parental Family History Reviewed: Yes


Children Family History Reviewed: Yes


Sibling(s) Family History Reviewed.: Yes





Medication/Allergy


Home Medications: 








No Home Medications  09/09/19 








Allergies/Adverse Reactions: 


                                        





cinnamon [Cinnamon] Allergy (Unknown, Verified 09/09/19 09:08)


   











Review of Systems


Constitutional: PRESENT: as per HPI, headache(s)


Eyes: ABSENT: visual disturbances


Ears: ABSENT: hearing changes


Nose, Mouth, and Throat: PRESENT: headache(s).  ABSENT: mouth pain, sore throat


Cardiovascular: PRESENT: edema - Trace pedal edema.  ABSENT: chest pain, 

palpitations


Respiratory: ABSENT: cough, dyspnea, hemoptysis, sputum


Gastrointestinal: ABSENT: coffee ground emesis, constipation, diarrhea, 

heartburn, nausea, vomiting


Genitourinary: PRESENT: other - Left flank pain.  ABSENT: difficulty urinating, 

dysuria


Musculoskeletal: ABSENT: deformity, joint swelling, muscle weakness


Integumentary: ABSENT: diaphoresis, pruritus, rash, wounds


Neurological: ABSENT: abnormal gait, abnormal movements, abnormal speech, 

confusion, memory loss, syncope, tremor(s), vertigo


Psychiatric: ABSENT: anxiety, depression, hallucinations


Endocrine: ABSENT: cold intolerance, heat intolerance, polydipsia, polyuria


Hematologic/Lymphatic: ABSENT: easy bleeding, easy bruising, lymphadenopathy


Allergic/Immunologic: ABSENT: seasonal rhinorrhea





Physical Exam


Vital Signs: 


                                        











Temp Pulse Resp BP Pulse Ox


 


 98.8 F   77   20   105/56 L  100 


 


 09/09/19 16:50  09/09/19 16:50  09/09/19 09:10  09/09/19 16:50  09/09/19 16:50








                                 Intake & Output











 09/08/19 09/09/19 09/10/19





 06:59 06:59 06:59


 


Intake Total   1000


 


Balance   1000


 


Weight   101.3 kg











Cardiovascular exam: PRESENT: RRR, +S1, +S2, systolic murmur - 1/6 systolic





Results


Laboratory Results: 


                                        





                                 09/09/19 10:18 





                                 09/09/19 10:18 





                                        











  09/09/19 09/09/19 09/09/19





  10:18 10:18 10:18


 


WBC  19.0 H  


 


RBC  4.33  


 


Hgb  12.8  


 


Hct  39.1  


 


MCV  90  


 


MCH  29.6  


 


MCHC  32.8  


 


RDW  13.4  


 


Plt Count  264  


 


Seg Neutrophils %  82.5 H  


 


Sodium   139.4 


 


Potassium   3.9 


 


Chloride   103 


 


Carbon Dioxide   29 


 


Anion Gap   7 


 


BUN   19 


 


Creatinine   0.65 


 


Est GFR ( Amer)   > 60 


 


Glucose   100 


 


Lactic Acid   


 


Calcium   9.1 


 


Total Bilirubin   0.2 


 


AST   15 


 


Alkaline Phosphatase   77 


 


Total Protein   6.4 


 


Albumin   3.8 


 


Lipase   94.6 


 


Serum HCG, Qual    NEGATIVE


 


Urine Color   


 


Urine Appearance   


 


Urine pH   


 


Ur Specific Gravity   


 


Urine Protein   


 


Urine Glucose (UA)   


 


Urine Ketones   


 


Urine Blood   


 


Urine Nitrite   


 


Ur Leukocyte Esterase   


 


Urine WBC (Auto)   


 


Urine RBC (Auto)   














  09/09/19 09/09/19





  10:18 11:48


 


WBC  


 


RBC  


 


Hgb  


 


Hct  


 


MCV  


 


MCH  


 


MCHC  


 


RDW  


 


Plt Count  


 


Seg Neutrophils %  


 


Sodium  


 


Potassium  


 


Chloride  


 


Carbon Dioxide  


 


Anion Gap  


 


BUN  


 


Creatinine  


 


Est GFR (African Amer)  


 


Glucose  


 


Lactic Acid   1.4


 


Calcium  


 


Total Bilirubin  


 


AST  


 


Alkaline Phosphatase  


 


Total Protein  


 


Albumin  


 


Lipase  


 


Serum HCG, Qual  


 


Urine Color  YELLOW 


 


Urine Appearance  CLOUDY 


 


Urine pH  6.0 


 


Ur Specific Gravity  1.024 


 


Urine Protein  NEGATIVE 


 


Urine Glucose (UA)  NEGATIVE 


 


Urine Ketones  NEGATIVE 


 


Urine Blood  SMALL H 


 


Urine Nitrite  NEGATIVE 


 


Ur Leukocyte Esterase  LARGE H 


 


Urine WBC (Auto)  89 


 


Urine RBC (Auto)  29 











Impressions: 


                                        





Abdomen/Pelvis CT  09/09/19 11:08


IMPRESSION:  NO ACUTE FINDINGS.  Left nephrolithiasis.   No hydronephrosis or 

hydroureter..


 














Assessment and Plan





- Diagnosis


(1) Pyelonephritis


Is this a current diagnosis for this admission?: Yes   


Plan: 


9/9/2019-the patient presents with pyelonephritis.  She was started on Unasyn in

 the emergency department and I have continue this.  I have asked for urine 

culture.  She did receive 1 dose of antibiotics prior to the hopeful collection 

of a specimen.  This should not impede growth of organisms present.  Will be 

useful for us to narrow the spectrum of antibiotics in anticipation of 

completing her antibiotics at home.  In addition she will get continuous IV 

fluid through the night.








(2) Leukocytosis


Qualifiers: 


   Leukocytosis type: unspecified   Qualified Code(s): D72.829 - Elevated white 

blood cell count, unspecified   


Is this a current diagnosis for this admission?: Yes   


Plan: 


9/9/2019-white blood cell count is 19,000.  Expect with antibiotics and IV 

fluids this will be lowered tomorrow.  Continue to monitor white blood cell 

count.








(3) Nephrolithiasis


Is this a current diagnosis for this admission?: Yes   


Plan: 


9/9/2019-the patient has 2 intra-calyceal stones on the left.  She reports 

having several stones previously.  I do not know how many they were originally. 

 She may have passed stones.  Because the stone is not in the ureter I will not 

administer Flomax.  She might consider urology and lithotripsy as an outpatient 

but I do not believe the stones are large enough to require that procedure.








(4) Headache


Qualifiers: 


   Headache type: unspecified   Headache chronicity pattern: acute headache   

Intractability: intractable   Qualified Code(s): R51 - Headache   


Is this a current diagnosis for this admission?: Yes   


Plan: 


9/9/2019-the patient reports that she has a headache.  The pain medications have

 not improved it.  It is likely a combination of the stress of her infection and

 I do not believe she has had much to eat or drink since arriving at the 

emergency department.  I told her roommate several medications available for 

her.








(5) History of total adrenalectomy


Is this a current diagnosis for this admission?: Yes   


Plan: 


9/9/2019-the patient has history of right adrenalectomy.  She is not on any 

prednisone therapy.  I could not find where a cortisol level had been drawn.  I 

will defer to her primary care provider for evaluation and management of a 

patient status post adrenalectomy.








- Time


Time Spent with patient: 35 or more minutes


Medications reviewed and adjusted accordingly: Yes


Anticipated discharge: Home





- Inpatient Certification


Based on my medical assessment, after consideration of the patient's 

comorbidities, presenting symptoms, or acuity I expect that the services needed 

warrant INPATIENT care.: Yes


I certify that my determination is in accordance with my understanding of 

Medicare's requirements for reasonable and necessary INPATIENT services [42 CFR 

412.3e].: Yes


Medical Necessity: Need For IV Fluids, Need for Pain Control, Need for IV 

Antibiotics


Post Hospital Care: D/C Planner Documentation





- Plan Summary


Plan Summary: 





The patient has had pyelonephritis before.  I did ask her to recall her previous

episode.  She states she received IV antibiotics, IV fluids and was in the 

hospital for close to 7 days.

## 2019-09-09 NOTE — ER DOCUMENT REPORT
ED GI/





- General


Chief Complaint: Possible Kidney Stone


Stated Complaint: FLANK PAIN


Time Seen by Provider: 09/09/19 11:03


Mode of Arrival: Ambulatory


Information source: Patient


Notes: 


History of Present Illness


Time:





Chief Complaint: Flank pain





39 years old female presents today with left flank pain since this morning with 

a history of kidney stone.  She was having chills associated with flank pain.  

She also had a history of pyelonephritis in the past.  Denies any fever.  Denies

any nausea vomiting denies any other constitutional symptoms





History obtained from patient


Symptoms began: Today


Onset: Gradual


Timing: Intermittent


Quality: ``pain


Intensity: Severe


Location: Flank


Migration: None


Radiation: None





Mechanism: None





Aggravating factors: None


Relieving factors: None





Denies significant traumatic injury


Denies weakness, numbness, incontinence


Denies IV drug use


Denies trouble with urination





Review of Systems All other systems negative as reviewed.





CONSTITUTIONAL 


No fever.


EYES 


No eye pain. 


ENT 


No URI symptoms, No sore throat, No ear pain.


CARDIOVASCULAR 


No chest pain, No palpitations, No edema.


RESPIRATORY 


No Cough, No SOB, No wheezing. 


GASTROINTESTINAL 


No abdominal pain, No diarrhea, No vomiting, No constipation, No melena, No 

rectal bleeding.


GENITOURINARY 


No UTI symptoms, No bleeding.


MUSCULOSKELETAL 


+ flank pain. 


SKIN 


No Rash.


NEUROLOGIC 


No Headache, No recent seizures, No paralysis, No parathesias. 





Physical Exam


CONSTITUTIONAL 


Vital signs reviewed, comfortable, Alert and oriented X 3.


HEAD 


Atraumatic, Normal cephalic.


EYES 


No discharge from eyes, Sclera are not injected, Extraocular muscles intact, 

Conjunctiva are normal.


ENT 


Ears normal to inspection, Nose examination normal, Oropharynx normal, Mucous 

membranes pink, moist, normal in color.


NECK 


Normal ROM, No jugular venous distention, No meningeal signs, No carotid bruit.


RESPIRATORY/CHEST 


Chest is non-tender, Breath sounds normal, No respiratory distress.


CARDIOVASCULAR


RRR, Heart sounds normal.


ABDOMEN 


Abdomen is non-tender, No masses, Bowel sounds normal, No distension, No 

peritoneal signs.


BACK 


Normal inspection. no focal bony tenderness, no CVA tenderness, no soft tissue 

tenderness, negative straight leg test bilaterally, bilateral 2+ knee deep 

tendon reflexes.


UPPER EXTREMITY 


Inspection normal, No cyanosis/clubbing/edema, 2+ radial pulses.


LOWER EXTREMITY 


Inspection normal, No cyanosis/clubbing/edema, 2+ femoral pulses.


NEURO 


Motor exam normal, Sensory exam normal.


SKIN 


Skin is warm and dry, No rash.


PSYCHIATRIC 


Normal affect. 

















TRAVEL OUTSIDE OF THE [place] IN LAST 30 DAYS: No





- HPI


Notes: 





09/09/19 11:37


Dictated





- Related Data


Allergies/Adverse Reactions: 


                                        





cinnamon [Cinnamon] Allergy (Unknown, Verified 09/09/19 09:08)


   











Past Medical History





- General


Information source: Patient





- Social History


Smoking Status: Current Some Day Smoker


Chew tobacco use (# tins/day): No


Frequency of alcohol use: Occasional


Drug Abuse: None


Family History: CVA, DM, Hypertension, Malignancy, Thyroid Disfunction, Other - 

Heart disease


Patient has suicidal ideation: No


Patient has homicidal ideation: No





- Past Medical History


Cardiac Medical History: Reports: None


Pulmonary Medical History: Reports: None


EENT Medical History: Reports: None


Neurological Medical History: Reports: None


Endocrine Medical History: Reports: None


Renal/ Medical History: Reports: Hx Kidney Stones


Malignancy Medical History: Reports: None


GI Medical History: Reports: None


Musculoskeletal Medical History: Reports Hx Musculoskeletal Trauma


Skin Medical History: Reports None


Psychiatric Medical History: Reports: None


Traumatic Medical History: Reports: Hx Fractures - right arm left leg


Infectious Medical History: Reports: None


Past Surgical History: Reports: Hx Kidney (Renal Surgery) - adrenal gland 

removal left, Other - Right adrenalectomy





- Immunizations


Immunizations up to date: Yes


Hx Diphtheria, Pertussis, Tetanus Vaccination: Yes - less 5 yrs





Review of Systems





- Review of Systems


Notes: 





Dictated





Physical Exam





- Vital signs


Vitals: 


                                        











Temp Pulse Resp BP Pulse Ox


 


 97.6 F   78   20   133/83 H  99 


 


 09/09/19 09:10  09/09/19 09:10  09/09/19 09:10  09/09/19 09:10  09/09/19 09:10














- Notes


Notes: 





Dictated





Course





- Re-evaluation


Re-evalutation: 





09/09/19 15:13


She was given IV fluid, IV pain medications, and antibiotic Unasyn.  Her case 

was discussed with hospitalist and currently being admitted.





- Vital Signs


Vital signs: 


                                        











Temp Pulse Resp BP Pulse Ox


 


 97.6 F   78   20   133/83 H  99 


 


 09/09/19 09:10  09/09/19 09:10  09/09/19 09:10  09/09/19 09:10  09/09/19 09:10














- Laboratory


Result Diagrams: 


                                 09/09/19 10:18





                                 09/09/19 10:18


Laboratory results interpreted by me: 


                                        











  09/09/19 09/09/19





  10:18 10:18


 


WBC  19.0 H 


 


Lymph % (Auto)  12.3 L 


 


Absolute Neuts (auto)  15.7 H 


 


Seg Neutrophils %  82.5 H 


 


Urine Blood   SMALL H


 


Ur Leukocyte Esterase   LARGE H














- Diagnostic Test


Radiology reviewed: Reports reviewed - CT of the abdomen without contrast showed

 left ureteric stone no obstruction.





Discharge





- Discharge


Clinical Impression: 


 Pyelonephritis





Condition: Fair


Disposition: HOME, SELF-CARE


Admitting Provider: Efrain (Hospitalist)


Unit Admitted: Medical Floor

## 2019-09-10 LAB
ADD MANUAL DIFF: NO
ANION GAP SERPL CALC-SCNC: 8 MMOL/L (ref 5–19)
BASOPHILS # BLD AUTO: 0 10^3/UL (ref 0–0.2)
BASOPHILS NFR BLD AUTO: 0.2 % (ref 0–2)
BUN SERPL-MCNC: 12 MG/DL (ref 7–20)
CALCIUM: 8.1 MG/DL (ref 8.4–10.2)
CHLORIDE SERPL-SCNC: 106 MMOL/L (ref 98–107)
CO2 SERPL-SCNC: 23 MMOL/L (ref 22–30)
EOSINOPHIL # BLD AUTO: 0.1 10^3/UL (ref 0–0.6)
EOSINOPHIL NFR BLD AUTO: 0.9 % (ref 0–6)
ERYTHROCYTE [DISTWIDTH] IN BLOOD BY AUTOMATED COUNT: 13.1 % (ref 11.5–14)
GLUCOSE SERPL-MCNC: 91 MG/DL (ref 75–110)
HCT VFR BLD CALC: 32.2 % (ref 36–47)
HGB BLD-MCNC: 10.8 G/DL (ref 12–15.5)
LYMPHOCYTES # BLD AUTO: 1.9 10^3/UL (ref 0.5–4.7)
LYMPHOCYTES NFR BLD AUTO: 12.1 % (ref 13–45)
MCH RBC QN AUTO: 30.1 PG (ref 27–33.4)
MCHC RBC AUTO-ENTMCNC: 33.4 G/DL (ref 32–36)
MCV RBC AUTO: 90 FL (ref 80–97)
MONOCYTES # BLD AUTO: 2.4 10^3/UL (ref 0.1–1.4)
MONOCYTES NFR BLD AUTO: 15.6 % (ref 3–13)
NEUTROPHILS # BLD AUTO: 11.1 10^3/UL (ref 1.7–8.2)
NEUTS SEG NFR BLD AUTO: 71.2 % (ref 42–78)
PLATELET # BLD: 202 10^3/UL (ref 150–450)
POTASSIUM SERPL-SCNC: 3.9 MMOL/L (ref 3.6–5)
RBC # BLD AUTO: 3.57 10^6/UL (ref 3.72–5.28)
TOTAL CELLS COUNTED % (AUTO): 100 %
WBC # BLD AUTO: 15.6 10^3/UL (ref 4–10.5)

## 2019-09-10 RX ADMIN — HYDROMORPHONE HYDROCHLORIDE PRN MG: 2 INJECTION INTRAMUSCULAR; INTRAVENOUS; SUBCUTANEOUS at 18:34

## 2019-09-10 RX ADMIN — ENOXAPARIN SODIUM SCH MG: 40 INJECTION SUBCUTANEOUS at 10:43

## 2019-09-10 RX ADMIN — ACETAMINOPHEN PRN MG: 325 TABLET ORAL at 18:34

## 2019-09-10 RX ADMIN — ACETAMINOPHEN PRN MG: 325 TABLET ORAL at 10:50

## 2019-09-10 RX ADMIN — Medication SCH ML: at 14:06

## 2019-09-10 RX ADMIN — HYDROMORPHONE HYDROCHLORIDE PRN MG: 2 INJECTION INTRAMUSCULAR; INTRAVENOUS; SUBCUTANEOUS at 23:18

## 2019-09-10 RX ADMIN — Medication SCH: at 21:10

## 2019-09-10 RX ADMIN — HYDROMORPHONE HYDROCHLORIDE PRN MG: 2 INJECTION INTRAMUSCULAR; INTRAVENOUS; SUBCUTANEOUS at 20:57

## 2019-09-10 RX ADMIN — IBUPROFEN PRN MG: 600 TABLET, FILM COATED ORAL at 21:49

## 2019-09-10 RX ADMIN — HYDROMORPHONE HYDROCHLORIDE PRN MG: 2 INJECTION INTRAMUSCULAR; INTRAVENOUS; SUBCUTANEOUS at 15:33

## 2019-09-10 RX ADMIN — Medication SCH: at 05:11

## 2019-09-10 RX ADMIN — AMPICILLIN SODIUM AND SULBACTAM SODIUM SCH MLS/HR: 2; 1 INJECTION, POWDER, FOR SOLUTION INTRAMUSCULAR; INTRAVENOUS at 02:43

## 2019-09-10 RX ADMIN — FAMOTIDINE SCH MG: 20 TABLET, FILM COATED ORAL at 21:02

## 2019-09-10 RX ADMIN — MORPHINE SULFATE PRN MG: 10 INJECTION INTRAMUSCULAR; INTRAVENOUS; SUBCUTANEOUS at 01:10

## 2019-09-10 RX ADMIN — IBUPROFEN PRN MG: 600 TABLET, FILM COATED ORAL at 08:24

## 2019-09-10 RX ADMIN — IBUPROFEN PRN MG: 600 TABLET, FILM COATED ORAL at 15:37

## 2019-09-10 RX ADMIN — MORPHINE SULFATE PRN MG: 10 INJECTION INTRAMUSCULAR; INTRAVENOUS; SUBCUTANEOUS at 08:25

## 2019-09-10 RX ADMIN — AMPICILLIN SODIUM AND SULBACTAM SODIUM SCH MLS/HR: 2; 1 INJECTION, POWDER, FOR SOLUTION INTRAMUSCULAR; INTRAVENOUS at 14:05

## 2019-09-10 RX ADMIN — HYDROMORPHONE HYDROCHLORIDE PRN MG: 2 INJECTION INTRAMUSCULAR; INTRAVENOUS; SUBCUTANEOUS at 10:43

## 2019-09-10 RX ADMIN — SODIUM CHLORIDE PRN MLS/HR: 9 INJECTION, SOLUTION INTRAVENOUS at 10:48

## 2019-09-10 RX ADMIN — AMPICILLIN SODIUM AND SULBACTAM SODIUM SCH MLS/HR: 2; 1 INJECTION, POWDER, FOR SOLUTION INTRAMUSCULAR; INTRAVENOUS at 21:02

## 2019-09-10 RX ADMIN — AMPICILLIN SODIUM AND SULBACTAM SODIUM SCH MLS/HR: 2; 1 INJECTION, POWDER, FOR SOLUTION INTRAMUSCULAR; INTRAVENOUS at 08:26

## 2019-09-10 RX ADMIN — MORPHINE SULFATE PRN MG: 10 INJECTION INTRAMUSCULAR; INTRAVENOUS; SUBCUTANEOUS at 06:13

## 2019-09-10 RX ADMIN — FAMOTIDINE SCH MG: 20 TABLET, FILM COATED ORAL at 10:43

## 2019-09-10 NOTE — PDOC PROGRESS REPORT
Subjective


Progress Note for:: 09/10/19


Subjective:: 


KAMLESH GARCIA is a 39 year old female with history of renal calculi on the 

left.  She has never had lithotripsy.  She has never seen urologist.  She woke 

up this morning with significant left flank pain.  Did not improve and after she

took her son to school the pain was bad enough that she presented to the 

emergency department.  The patient states that when she got to the emergency 

department in addition to the pain she began to experience chills, fever and alan

phoresis.  Her white blood cell count was 19,000, urinalysis suggested infection

but unfortunately no urine culture was obtained.  She was started on antibiotic 

therapy and referred to the hospital service for admission.





9/10/2019.  No acute events overnight.  Patient complaining of persistent left 

flank pain, 3/4, morphine not very effective, relieved by Dilaudid, patient 

ambulatory, p.o. tolerant, having normal bowel and bladder movement.  Denies any

fever, chills, nausea, vomiting, diarrhea, constipation or any urinary symptoms.


Reason For Visit: 


RENAL CALCULTI WITH PYELONEPHRITIS LEFT SIDE








Physical Exam


Vital Signs: 


                                        











Temp Pulse Resp BP Pulse Ox


 


 97.5 F   73   16   104/55 L  98 


 


 09/10/19 15:17  09/10/19 15:17  09/10/19 15:17  09/10/19 15:17  09/10/19 15:17








                                 Intake & Output











 09/09/19 09/10/19 09/11/19





 06:59 06:59 06:59


 


Intake Total  1200 600


 


Output Total  400 


 


Balance  800 600


 


Weight  104 kg 











General appearance: PRESENT: no acute distress, well-developed, well-nourished


Head exam: PRESENT: atraumatic, normocephalic


Eye exam: PRESENT: conjunctiva pink, EOMI, PERRLA.  ABSENT: scleral icterus


Ear exam: PRESENT: normal external ear exam


Mouth exam: PRESENT: moist, tongue midline


Neck exam: ABSENT: carotid bruit, JVD, lymphadenopathy, thyromegaly


Respiratory exam: PRESENT: clear to auscultation neeraj.  ABSENT: rales, rhonchi, 

wheezes


Cardiovascular exam: PRESENT: RRR.  ABSENT: diastolic murmur, rubs, systolic 

murmur


Pulses: PRESENT: normal dorsalis pedis pul


Vascular exam: PRESENT: normal capillary refill


GI/Abdominal exam: PRESENT: normal bowel sounds, soft.  ABSENT: distended, gua

rding, mass, organolmegaly, rebound, tenderness


Rectal exam: PRESENT: deferred


Extremities exam: PRESENT: full ROM.  ABSENT: calf tenderness, clubbing, pedal 

edema


Neurological exam: PRESENT: alert, awake, oriented to person, oriented to place,

oriented to time, oriented to situation, CN II-XII grossly intact.  ABSENT: jag

r sensory deficit


Psychiatric exam: PRESENT: appropriate affect, normal mood.  ABSENT: homicidal 

ideation, suicidal ideation


Skin exam: PRESENT: dry, intact, warm.  ABSENT: cyanosis, rash





Results


Laboratory Results: 


                                        





                                 09/10/19 05:52 





                                 09/10/19 05:52 





                                        











  09/10/19 09/10/19





  05:52 05:52


 


WBC  15.6 H 


 


RBC  3.57 L 


 


Hgb  10.8 L 


 


Hct  32.2 L 


 


MCV  90 


 


MCH  30.1 


 


MCHC  33.4 


 


RDW  13.1 


 


Plt Count  202 


 


Seg Neutrophils %  71.2 


 


Sodium   136.7 L


 


Potassium   3.9


 


Chloride   106


 


Carbon Dioxide   23


 


Anion Gap   8


 


BUN   12


 


Creatinine   0.57


 


Est GFR (African Amer)   > 60


 


Glucose   91


 


Calcium   8.1 L











Impressions: 


                                        





Abdomen/Pelvis CT  09/09/19 11:08


IMPRESSION:  NO ACUTE FINDINGS.  Left nephrolithiasis.   No hydronephrosis or 

hydroureter..


 














Assessment and Plan





- Diagnosis


(1) Nephrolithiasis


Is this a current diagnosis for this admission?: Yes   


Plan: 


History of recurrent nephrolithiasis. 


Denies any history of pyelonephritis. 


Has not been evaluated or seen by a urologist.  


Denies any history of lithotripsy. 


CT abdomen positive for 2 intracalyceal stones on the left no previous records 

available.  No pyelonephritis.





9/9/2019.  CT abdomen:. Left nephrolithiasis, no hydronephrosis or hydroureter. 

No pyelonephritis.





Day 2 IV Unasyn.


Day 2 IV antibiotics.


Cultures no growth to follow-up





Continue empiric IV antibiotics.


Continue opioid and non-opioid analgesics, taper if possible.


Continue IV fluids guided by volume status.





We will arrange outpatient neurology follow-up.








(2) Headache


Qualifiers: 


   Headache type: unspecified   Headache chronicity pattern: acute headache   

Intractability: intractable   Qualified Code(s): R51 - Headache   


Is this a current diagnosis for this admission?: Yes   


Plan: 


Likely due to underlying acute illness.


Denies any focal neurological deficits.


Continue supportive measures.








(3) History of total adrenalectomy


Is this a current diagnosis for this admission?: Yes   


Plan: 


Endorses history of right adrenalectomy.  


Not on a steroid replacement therapy.


Vitals and electrolytes WNL.


Outpatient PCP follow-up.  








(4) Leukocytosis


Qualifiers: 


   Leukocytosis type: unspecified   Qualified Code(s): D72.829 - Elevated white 

blood cell count, unspecified   


Is this a current diagnosis for this admission?: Yes   


Plan: 


Likely due to nephrolithiasis.  Improving.  No bandemia.  Cultures WNL.  


Plan as per #1.








(5) Obesity (BMI 35.0-39.9 without comorbidity)


Is this a current diagnosis for this admission?: Yes   


Plan: 


BMI 35.9.  Fasting blood glucose level WNL.  


Diet and lifestyle modification recommended.

## 2019-09-11 RX ADMIN — HYDROMORPHONE HYDROCHLORIDE PRN MG: 2 INJECTION INTRAMUSCULAR; INTRAVENOUS; SUBCUTANEOUS at 09:17

## 2019-09-11 RX ADMIN — HYDROCODONE BITARTRATE AND ACETAMINOPHEN PRN TAB: 5; 325 TABLET ORAL at 22:31

## 2019-09-11 RX ADMIN — HYDROMORPHONE HYDROCHLORIDE PRN MG: 2 INJECTION INTRAMUSCULAR; INTRAVENOUS; SUBCUTANEOUS at 03:35

## 2019-09-11 RX ADMIN — Medication SCH: at 06:08

## 2019-09-11 RX ADMIN — FAMOTIDINE SCH MG: 20 TABLET, FILM COATED ORAL at 22:31

## 2019-09-11 RX ADMIN — HYDROMORPHONE HYDROCHLORIDE PRN MG: 2 INJECTION INTRAMUSCULAR; INTRAVENOUS; SUBCUTANEOUS at 15:52

## 2019-09-11 RX ADMIN — HYDROMORPHONE HYDROCHLORIDE PRN MG: 2 INJECTION INTRAMUSCULAR; INTRAVENOUS; SUBCUTANEOUS at 12:07

## 2019-09-11 RX ADMIN — SODIUM CHLORIDE PRN MLS/HR: 9 INJECTION, SOLUTION INTRAVENOUS at 08:51

## 2019-09-11 RX ADMIN — FAMOTIDINE SCH MG: 20 TABLET, FILM COATED ORAL at 12:07

## 2019-09-11 RX ADMIN — Medication SCH: at 22:30

## 2019-09-11 RX ADMIN — AMPICILLIN SODIUM AND SULBACTAM SODIUM SCH MLS/HR: 2; 1 INJECTION, POWDER, FOR SOLUTION INTRAMUSCULAR; INTRAVENOUS at 20:22

## 2019-09-11 RX ADMIN — Medication SCH: at 14:05

## 2019-09-11 RX ADMIN — ACETAMINOPHEN PRN MG: 325 TABLET ORAL at 03:35

## 2019-09-11 RX ADMIN — IBUPROFEN PRN MG: 600 TABLET, FILM COATED ORAL at 15:23

## 2019-09-11 RX ADMIN — ENOXAPARIN SODIUM SCH MG: 40 INJECTION SUBCUTANEOUS at 12:07

## 2019-09-11 RX ADMIN — AMPICILLIN SODIUM AND SULBACTAM SODIUM SCH MLS/HR: 2; 1 INJECTION, POWDER, FOR SOLUTION INTRAMUSCULAR; INTRAVENOUS at 08:50

## 2019-09-11 RX ADMIN — AMPICILLIN SODIUM AND SULBACTAM SODIUM SCH MLS/HR: 2; 1 INJECTION, POWDER, FOR SOLUTION INTRAMUSCULAR; INTRAVENOUS at 03:36

## 2019-09-11 RX ADMIN — AMPICILLIN SODIUM AND SULBACTAM SODIUM SCH MLS/HR: 2; 1 INJECTION, POWDER, FOR SOLUTION INTRAMUSCULAR; INTRAVENOUS at 15:23

## 2019-09-11 RX ADMIN — IBUPROFEN PRN MG: 600 TABLET, FILM COATED ORAL at 08:51

## 2019-09-11 RX ADMIN — HYDROMORPHONE HYDROCHLORIDE PRN MG: 2 INJECTION INTRAMUSCULAR; INTRAVENOUS; SUBCUTANEOUS at 20:22

## 2019-09-11 RX ADMIN — HYDROCODONE BITARTRATE AND ACETAMINOPHEN PRN TAB: 5; 325 TABLET ORAL at 14:48

## 2019-09-11 NOTE — PDOC PROGRESS REPORT
Subjective


Progress Note for:: 09/11/19


Subjective:: 


KAMLESH GARCIA is a 39 year old female with history of renal calculi on the 

left.  She has never had lithotripsy.  She has never seen urologist.  She woke 

up this morning with significant left flank pain.  Did not improve and after she

took her son to school the pain was bad enough that she presented to the 

emergency department.  The patient states that when she got to the emergency 

department in addition to the pain she began to experience chills, fever and alan

phoresis.  Her white blood cell count was 19,000, urinalysis suggested infection

but unfortunately no urine culture was obtained.  She was started on antibiotic 

therapy and referred to the hospital service for admission.





9/10/2019.  No acute events overnight.  Patient complaining of persistent left 

flank pain, 3/4, morphine not very effective, relieved by Dilaudid, patient 

ambulatory, p.o. tolerant, having normal bowel and bladder movement.  Denies any

fever, chills, nausea, vomiting, diarrhea, constipation or any urinary symptoms.





9/11/2019.  No acute events overnight.  Patient still complaining of persistent 

left flank pain, 3/4, worse with movement, better with Dilaudid and rest, p.o. t

olerant, ambulatory, having normal bowel and bladder movements, denies any 

fever, chills, chest pain, nausea, vomiting, diarrhea or any urinary symptoms.


Reason For Visit: 


RENAL CALCULTI WITH PYELONEPHRITIS LEFT SIDE








Physical Exam


Vital Signs: 


                                        











Temp Pulse Resp BP Pulse Ox


 


 97.5 F   70   14   99/58 L  98 


 


 09/11/19 07:21  09/11/19 07:21  09/11/19 07:21  09/11/19 07:21  09/11/19 07:21








                                 Intake & Output











 09/10/19 09/11/19 09/12/19





 06:59 06:59 06:59


 


Intake Total 1200 1900 


 


Output Total 400 900 


 


Balance 800 1000 


 


Weight 104 kg 104 kg 











General appearance: PRESENT: no acute distress, well-developed, well-nourished


Head exam: PRESENT: atraumatic, normocephalic


Respiratory exam: PRESENT: clear to auscultation neeraj.  ABSENT: rales, rhonchi, 

wheezes


Cardiovascular exam: PRESENT: RRR.  ABSENT: diastolic murmur, rubs, systolic 

murmur


GI/Abdominal exam: PRESENT: normal bowel sounds, soft.  ABSENT: distended, 

guarding, mass, organolmegaly, rebound, tenderness


Neurological exam: PRESENT: alert, awake, oriented to person, oriented to place,

oriented to time, oriented to situation, CN II-XII grossly intact.  ABSENT: 

motor sensory deficit





Results


Laboratory Results: 


                                        





                                 09/10/19 05:52 





                                 09/10/19 05:52 








Impressions: 


                                        





Abdomen/Pelvis CT  09/09/19 11:08


IMPRESSION:  NO ACUTE FINDINGS.  Left nephrolithiasis.   No hydronephrosis or 

hydroureter..


 














Assessment and Plan





- Diagnosis


(1) Nephrolithiasis


Is this a current diagnosis for this admission?: Yes   


Plan: 


History of recurrent nephrolithiasis. 


Denies any history of pyelonephritis. 


Has not been evaluated or seen by a urologist.  


Denies any history of lithotripsy. 


CT abdomen positive for 2 intracalyceal stones on the left no previous records 

available.  No pyelonephritis.





9/9/2019.  CT abdomen:. Left nephrolithiasis, no hydronephrosis or hydroureter. 

No pyelonephritis.





Day 3 IV Unasyn.


Day 3 IV antibiotics.


Cultures no growth to follow-up





Continue empiric IV antibiotics.


Continue opioid and non-opioid analgesics, taper if possible.


Continue IV fluids guided by volume status.





We will arrange outpatient neurology follow-up.








(2) UTI (urinary tract infection)


Qualifiers: 


   Hematuria presence: with hematuria 


Is this a current diagnosis for this admission?: Yes   


Plan: 


Likely due to gram-negative rods.


UA positive for leukocyte esterase on admission.


Cultures no growth so far.


Day 3 IV antibiotics.


Day 3 IV Unasyn.


Continue PEG IV antibiotics, follow cultures and sensitivity.








(3) Headache


Qualifiers: 


   Headache type: unspecified   Headache chronicity pattern: acute headache   

Intractability: intractable   Qualified Code(s): R51 - Headache   


Is this a current diagnosis for this admission?: Yes   


Plan: 


Improving. 


Likely due to underlying acute illness.


Denies any focal neurological deficits.


Continue supportive measures.








(4) History of total adrenalectomy


Is this a current diagnosis for this admission?: Yes   


Plan: 


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 

hyperaldosteronism.





Not on a steroid replacement therapy.





Vitals and electrolytes WNL.





Outpatient PCP follow-up.  








(5) Leukocytosis


Qualifiers: 


   Leukocytosis type: unspecified   Qualified Code(s): D72.829 - Elevated white 

blood cell count, unspecified   


Is this a current diagnosis for this admission?: Yes   


Plan: 


Likely due to UTI and nephrolithiasis. 


Improving.  No bandemia.  Cultures WNL.  


Plan as per #1.








(6) Obesity (BMI 35.0-39.9 without comorbidity)


Is this a current diagnosis for this admission?: Yes   


Plan: 


BMI 35.9.  Fasting blood glucose level WNL.  


Diet and lifestyle modification recommended.

## 2019-09-12 LAB
ADD MANUAL DIFF: NO
ALBUMIN SERPL-MCNC: 3.1 G/DL (ref 3.5–5)
ALP SERPL-CCNC: 60 U/L (ref 38–126)
ANION GAP SERPL CALC-SCNC: 8 MMOL/L (ref 5–19)
AST SERPL-CCNC: 16 U/L (ref 14–36)
BASOPHILS # BLD AUTO: 0 10^3/UL (ref 0–0.2)
BASOPHILS NFR BLD AUTO: 0.5 % (ref 0–2)
BILIRUB SERPL-MCNC: < 0.1 MG/DL (ref 0.2–1.3)
BUN SERPL-MCNC: 15 MG/DL (ref 7–20)
CALCIUM: 8.7 MG/DL (ref 8.4–10.2)
CHLORIDE SERPL-SCNC: 104 MMOL/L (ref 98–107)
CO2 SERPL-SCNC: 25 MMOL/L (ref 22–30)
EOSINOPHIL # BLD AUTO: 0.2 10^3/UL (ref 0–0.6)
EOSINOPHIL NFR BLD AUTO: 2.8 % (ref 0–6)
ERYTHROCYTE [DISTWIDTH] IN BLOOD BY AUTOMATED COUNT: 13.2 % (ref 11.5–14)
GLUCOSE SERPL-MCNC: 95 MG/DL (ref 75–110)
HCT VFR BLD CALC: 30.5 % (ref 36–47)
HGB BLD-MCNC: 10.3 G/DL (ref 12–15.5)
LYMPHOCYTES # BLD AUTO: 1.9 10^3/UL (ref 0.5–4.7)
LYMPHOCYTES NFR BLD AUTO: 25.5 % (ref 13–45)
MCH RBC QN AUTO: 30.5 PG (ref 27–33.4)
MCHC RBC AUTO-ENTMCNC: 33.8 G/DL (ref 32–36)
MCV RBC AUTO: 90 FL (ref 80–97)
MONOCYTES # BLD AUTO: 1 10^3/UL (ref 0.1–1.4)
MONOCYTES NFR BLD AUTO: 13.6 % (ref 3–13)
NEUTROPHILS # BLD AUTO: 4.3 10^3/UL (ref 1.7–8.2)
NEUTS SEG NFR BLD AUTO: 57.6 % (ref 42–78)
PLATELET # BLD: 197 10^3/UL (ref 150–450)
POTASSIUM SERPL-SCNC: 4.1 MMOL/L (ref 3.6–5)
PROT SERPL-MCNC: 5.5 G/DL (ref 6.3–8.2)
RBC # BLD AUTO: 3.37 10^6/UL (ref 3.72–5.28)
TOTAL CELLS COUNTED % (AUTO): 100 %
WBC # BLD AUTO: 7.5 10^3/UL (ref 4–10.5)

## 2019-09-12 RX ADMIN — Medication SCH ML: at 15:09

## 2019-09-12 RX ADMIN — Medication SCH: at 22:17

## 2019-09-12 RX ADMIN — AMPICILLIN SODIUM AND SULBACTAM SODIUM SCH MLS/HR: 2; 1 INJECTION, POWDER, FOR SOLUTION INTRAMUSCULAR; INTRAVENOUS at 02:02

## 2019-09-12 RX ADMIN — AMPICILLIN SODIUM AND SULBACTAM SODIUM SCH MLS/HR: 2; 1 INJECTION, POWDER, FOR SOLUTION INTRAMUSCULAR; INTRAVENOUS at 21:56

## 2019-09-12 RX ADMIN — AMPICILLIN SODIUM AND SULBACTAM SODIUM SCH MLS/HR: 2; 1 INJECTION, POWDER, FOR SOLUTION INTRAMUSCULAR; INTRAVENOUS at 15:06

## 2019-09-12 RX ADMIN — ACETAMINOPHEN PRN MG: 325 TABLET ORAL at 02:02

## 2019-09-12 RX ADMIN — FAMOTIDINE SCH MG: 20 TABLET, FILM COATED ORAL at 21:56

## 2019-09-12 RX ADMIN — ACETAMINOPHEN PRN MG: 325 TABLET ORAL at 20:00

## 2019-09-12 RX ADMIN — AMPICILLIN SODIUM AND SULBACTAM SODIUM SCH MLS/HR: 2; 1 INJECTION, POWDER, FOR SOLUTION INTRAMUSCULAR; INTRAVENOUS at 08:19

## 2019-09-12 RX ADMIN — HYDROCODONE BITARTRATE AND ACETAMINOPHEN PRN TAB: 5; 325 TABLET ORAL at 11:23

## 2019-09-12 RX ADMIN — ACETAMINOPHEN PRN MG: 325 TABLET ORAL at 15:03

## 2019-09-12 RX ADMIN — HYDROMORPHONE HYDROCHLORIDE PRN MG: 2 INJECTION INTRAMUSCULAR; INTRAVENOUS; SUBCUTANEOUS at 15:05

## 2019-09-12 RX ADMIN — FAMOTIDINE SCH MG: 20 TABLET, FILM COATED ORAL at 11:23

## 2019-09-12 RX ADMIN — SODIUM CHLORIDE PRN MLS/HR: 9 INJECTION, SOLUTION INTRAVENOUS at 08:20

## 2019-09-12 RX ADMIN — HYDROMORPHONE HYDROCHLORIDE PRN MG: 2 INJECTION INTRAMUSCULAR; INTRAVENOUS; SUBCUTANEOUS at 20:00

## 2019-09-12 RX ADMIN — HYDROMORPHONE HYDROCHLORIDE PRN MG: 2 INJECTION INTRAMUSCULAR; INTRAVENOUS; SUBCUTANEOUS at 01:58

## 2019-09-12 RX ADMIN — SODIUM CHLORIDE PRN MLS/HR: 9 INJECTION, SOLUTION INTRAVENOUS at 18:36

## 2019-09-12 RX ADMIN — HYDROCODONE BITARTRATE AND ACETAMINOPHEN PRN TAB: 5; 325 TABLET ORAL at 18:35

## 2019-09-12 RX ADMIN — Medication SCH: at 06:05

## 2019-09-12 RX ADMIN — ENOXAPARIN SODIUM SCH MG: 40 INJECTION SUBCUTANEOUS at 11:22

## 2019-09-12 NOTE — PDOC PROGRESS REPORT
Subjective


Progress Note for:: 09/12/19


Subjective:: 


KAMLESH GARCIA is a 39 year old female with history of renal calculi on the 

left.  She has never had lithotripsy.  She has never seen urologist.  She woke 

up this morning with significant left flank pain.  Did not improve and after she

took her son to school the pain was bad enough that she presented to the 

emergency department.  The patient states that when she got to the emergency 

department in addition to the pain she began to experience chills, fever and alan

phoresis.  Her white blood cell count was 19,000, urinalysis suggested infection

but unfortunately no urine culture was obtained.  She was started on antibiotic 

therapy and referred to the hospital service for admission.





9/10/2019.  No acute events overnight.  Patient complaining of persistent left 

flank pain, 3/4, morphine not very effective, relieved by Dilaudid, patient 

ambulatory, p.o. tolerant, having normal bowel and bladder movement.  Denies any

fever, chills, nausea, vomiting, diarrhea, constipation or any urinary symptoms.





9/11/2019.  No acute events overnight.  Patient still complaining of persistent 

left flank pain, 3/4, worse with movement, better with Dilaudid and rest, p.o. t

olerant, ambulatory, having normal bowel and bladder movements, denies any 

fever, chills, chest pain, nausea, vomiting, diarrhea or any urinary symptoms.





9/12/2019.  No acute events overnight.  Left flank pain improving, 12 out of 5,

relieved with Percocet and Dilaudid.  Denies any nausea, vomiting, diarrhea, 

constipation or any urinary symptoms.  Patient is ambulatory, having normal 

bowel and bladder movement.  Plan is to possibly discharge home tomorrow and 

taper down her Dilaudid and Percocet today.


Reason For Visit: 


RENAL CALCULTI WITH PYELONEPHRITIS LEFT SIDE








Physical Exam


Vital Signs: 


                                        











Temp Pulse Resp BP Pulse Ox


 


 98.3 F   73   14   120/73   100 


 


 09/12/19 07:14  09/12/19 07:55  09/12/19 07:55  09/12/19 07:55  09/12/19 07:55








                                 Intake & Output











 09/11/19 09/12/19 09/13/19





 06:59 06:59 06:59


 


Intake Total 1900 2380 


 


Output Total 900  


 


Balance 1000 2380 


 


Weight 104 kg 104 kg 











General appearance: PRESENT: no acute distress, obese, well-developed, well-

nourished


Head exam: PRESENT: atraumatic, normocephalic


Eye exam: PRESENT: conjunctiva pink, EOMI, PERRLA.  ABSENT: scleral icterus


Ear exam: PRESENT: normal external ear exam


Mouth exam: PRESENT: moist, tongue midline


Neck exam: ABSENT: carotid bruit, JVD, lymphadenopathy, thyromegaly


Respiratory exam: PRESENT: clear to auscultation neeraj.  ABSENT: rales, rhonchi, 

wheezes


Cardiovascular exam: PRESENT: RRR.  ABSENT: diastolic murmur, rubs, systolic 

murmur


Pulses: PRESENT: normal dorsalis pedis pul


Vascular exam: PRESENT: normal capillary refill


GI/Abdominal exam: PRESENT: normal bowel sounds, soft.  ABSENT: distended, 

guarding, mass, organolmegaly, rebound, tenderness


Rectal exam: PRESENT: deferred


Extremities exam: PRESENT: full ROM.  ABSENT: calf tenderness, clubbing, pedal 

edema


Neurological exam: PRESENT: alert, awake, oriented to person, oriented to place,

oriented to time, oriented to situation, CN II-XII grossly intact.  ABSENT: 

motor sensory deficit


Psychiatric exam: PRESENT: appropriate affect, normal mood.  ABSENT: homicidal 

ideation, suicidal ideation


Skin exam: PRESENT: dry, intact, warm.  ABSENT: cyanosis, rash





Results


Laboratory Results: 


                                        





                                 09/12/19 07:43 





                                 09/12/19 07:43 





                                        











  09/12/19 09/12/19





  07:43 07:43


 


WBC  7.5 


 


RBC  3.37 L 


 


Hgb  10.3 L 


 


Hct  30.5 L 


 


MCV  90 


 


MCH  30.5 


 


MCHC  33.8 


 


RDW  13.2 


 


Plt Count  197 


 


Seg Neutrophils %  57.6 


 


Sodium   136.6 L


 


Potassium   4.1


 


Chloride   104


 


Carbon Dioxide   25


 


Anion Gap   8


 


BUN   15


 


Creatinine   0.68


 


Est GFR (African Amer)   > 60


 


Glucose   95


 


Calcium   8.7


 


Total Bilirubin   < 0.1 L


 


AST   16


 


Alkaline Phosphatase   60


 


Total Protein   5.5 L


 


Albumin   3.1 L








                                        





09/10/19 01:18   Clean Catch Midstream   Urine Culture - Final


                            1,000 col/ml








Impressions: 


                                        





Abdomen/Pelvis CT  09/09/19 11:08


IMPRESSION:  NO ACUTE FINDINGS.  Left nephrolithiasis.   No hydronephrosis or 

hydroureter..


 














Assessment and Plan





- Diagnosis


(1) Nephrolithiasis


Is this a current diagnosis for this admission?: Yes   


Plan: 


History of recurrent nephrolithiasis. 


Denies any history of pyelonephritis. 


Has not been evaluated or seen by a urologist.  


Denies any history of lithotripsy. 


CT abdomen positive for 2 intracalyceal stones on the left no previous records 

available.  No pyelonephritis.





9/9/2019.  CT abdomen:. Left nephrolithiasis, no hydronephrosis or hydroureter. 

No pyelonephritis.





Day 4 IV Unasyn.


Day 4 IV antibiotics.


Cultures no growth to follow-up





Continue empiric IV antibiotics.


Continue opioid and non-opioid analgesics, taper if possible.


Continue IV fluids guided by volume status.





We will arrange outpatient urology follow-up.








(2) UTI (urinary tract infection)


Qualifiers: 


   Hematuria presence: with hematuria 


Is this a current diagnosis for this admission?: Yes   


Plan: 


Likely due to gram-negative rods.


UA positive for leukocyte esterase on admission.


Cultures no growth so far.


Day 4 IV antibiotics.


Day 4 IV Unasyn.


Continue IV antibiotics, follow cultures and sensitivity.








(3) Headache


Qualifiers: 


   Headache type: unspecified   Headache chronicity pattern: acute headache   

Intractability: intractable   Qualified Code(s): R51 - Headache   


Is this a current diagnosis for this admission?: Yes   


Plan: 


Resolved.   


Likely due to underlying acute illness.


Denies any focal neurological deficits.


Continue supportive measures.








(4) History of total adrenalectomy


Is this a current diagnosis for this admission?: Yes   


Plan: 


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 

hyperaldosteronism.





Not on a steroid replacement therapy.





Vitals and electrolytes WNL.





Outpatient PCP follow-up.  








(5) Leukocytosis


Qualifiers: 


   Leukocytosis type: unspecified   Qualified Code(s): D72.829 - Elevated white 

blood cell count, unspecified   


Is this a current diagnosis for this admission?: Yes   


Plan: 


Resolved.


Likely due to UTI and nephrolithiasis. 


Plan as per #1.








(6) Obesity (BMI 35.0-39.9 without comorbidity)


Is this a current diagnosis for this admission?: Yes   


Plan: 


BMI 35.9.  Fasting blood glucose level WNL.  


Diet and lifestyle modification recommended.

## 2019-09-13 VITALS — DIASTOLIC BLOOD PRESSURE: 69 MMHG | SYSTOLIC BLOOD PRESSURE: 115 MMHG

## 2019-09-13 RX ADMIN — AMPICILLIN SODIUM AND SULBACTAM SODIUM SCH MLS/HR: 2; 1 INJECTION, POWDER, FOR SOLUTION INTRAMUSCULAR; INTRAVENOUS at 02:16

## 2019-09-13 RX ADMIN — SODIUM CHLORIDE PRN MLS/HR: 9 INJECTION, SOLUTION INTRAVENOUS at 06:16

## 2019-09-13 RX ADMIN — Medication SCH: at 07:31

## 2019-09-13 RX ADMIN — AMPICILLIN SODIUM AND SULBACTAM SODIUM SCH MLS/HR: 2; 1 INJECTION, POWDER, FOR SOLUTION INTRAMUSCULAR; INTRAVENOUS at 08:11

## 2019-09-13 RX ADMIN — HYDROMORPHONE HYDROCHLORIDE PRN MG: 2 INJECTION INTRAMUSCULAR; INTRAVENOUS; SUBCUTANEOUS at 02:04

## 2019-09-13 RX ADMIN — FAMOTIDINE SCH MG: 20 TABLET, FILM COATED ORAL at 09:59

## 2019-09-13 RX ADMIN — ENOXAPARIN SODIUM SCH MG: 40 INJECTION SUBCUTANEOUS at 09:59

## 2019-09-19 NOTE — PDOC DISCHARGE SUMMARY
General





- Admit/Disc Date/PCP


Admission Date/Primary Care Provider: 


  09/09/19 15:44





  





Discharge Date: 09/13/19





- Discharge Diagnosis


(1) Nephrolithiasis


Is this a current diagnosis for this admission?: Yes   





(2) UTI (urinary tract infection)


Is this a current diagnosis for this admission?: Yes   





(3) Headache


Is this a current diagnosis for this admission?: Yes   





(4) History of total adrenalectomy


Is this a current diagnosis for this admission?: Yes   





(5) Leukocytosis


Is this a current diagnosis for this admission?: Yes   





(6) Obesity (BMI 35.0-39.9 without comorbidity)


Is this a current diagnosis for this admission?: Yes   





- Additional Information


Resuscitation Status: Full Code


Discharge Diet: Regular


Discharge Activity: Activity As Tolerated


Prescriptions: 


Levofloxacin [Levaquin 500 mg Tablet] 500 mg PO DAILY 5 Days #5 tablet


Oxycodone HCl/Acetaminophen [Percocet 5-325 mg Tablet] 1 tab PO Q8 5 Days #15 

tab


Home Medications: 








Levofloxacin [Levaquin 500 mg Tablet] 500 mg PO DAILY 5 Days #5 tablet 09/13/19 


Oxycodone HCl/Acetaminophen [Percocet 5-325 mg Tablet] 1 tab PO Q8 5 Days #15 

tab 09/13/19 











History of Present Illness


History of Present Illness: 


KAMLESH GARCIA is a 39 year old female with history of renal calculi on the 

left.  She has never had lithotripsy.  She has never seen urologist.  She woke 

up this morning with significant left flank pain.  Did not improve and after she

took her son to school the pain was bad enough that she presented to the 

emergency department.  The patient states that when she got to the emergency 

department in addition to the pain she began to experience chills, fever and 

diaphoresis.  Her white blood cell count was 19,000, urinalysis suggested 

infection but unfortunately no urine culture was obtained.  She was started on 

antibiotic therapy and referred to the hospital service for admission.








Hospital Course


Hospital Course: 





(1) Nephrolithiasis


History of recurrent nephrolithiasis. 


Denies any history of pyelonephritis. 


Has not been evaluated or seen by a urologist.  


Denies any history of lithotripsy. 


CT abdomen positive for 2 intracalyceal stones on the left no previous records 

available.  No pyelonephritis.


9/9/2019.  CT abdomen:. Left nephrolithiasis, no hydronephrosis or hydroureter. 

No pyelonephritis.





Was started on opioid and non-opioid analgesics, iv fluids and iv antibiotics. 





Received 5 IV Unasyn.





Opoid analgesics tapared. 





Was discharged on Levofloxacin for another 5 days. 





An appointment was made for her to see urologist on 9/16/2019. 





(2) UTI (urinary tract infection)


Likely due to gram-negative rods.


UA positive for leukocyte esterase on admission.


Cultures remained negative. 





Antibotics as above. 





(3) Headache


Resolved.   


Likely due to underlying acute illness.


Denies any focal neurological deficits.


Continued supportive measures.





(4) History of total adrenalectomy


Endorsed history of Lt adrenalectomy with adrenal mass causing 

hyperaldosteronism.


Not on a steroid replacement therapy.


Vitals and electrolytes WNL.


Outpatient PCP follow-up.  





(5) Leukocytosis


Resolved.


Likely due to UTI and nephrolithiasis. 


Plan as per #1.








(6) Obesity (BMI 35.0-39.9 without comorbidity)


BMI 35.9.  Fasting blood glucose level WNL.  


Diet and lifestyle modification recommended.





Physical Exam


Vital Signs: 


                                        











Temp Pulse Resp BP Pulse Ox


 


 98.4 F   58 L  20   115/69   98 


 


 09/13/19 12:28  09/13/19 12:28  09/13/19 12:28  09/13/19 12:28  09/13/19 12:28











General appearance: PRESENT: obese


Respiratory exam: PRESENT: clear to auscultation neeraj.  ABSENT: rales, rhonchi, 

wheezes


Cardiovascular exam: PRESENT: RRR.  ABSENT: diastolic murmur, rubs, systolic 

murmur


Pulses: PRESENT: normal dorsalis pedis pul


GI/Abdominal exam: PRESENT: normal bowel sounds, soft.  ABSENT: distended, 

guarding, mass, organolmegaly, rebound, tenderness


Neurological exam: PRESENT: alert, awake, oriented to person, oriented to place,

oriented to time, oriented to situation, CN II-XII grossly intact.  ABSENT: 

motor sensory deficit





Results


Laboratory Results: 


                                        





                                 09/12/19 07:43 





                                 09/12/19 07:43 








Impressions: 


                                        





Abdomen/Pelvis CT  09/09/19 11:08


IMPRESSION:  NO ACUTE FINDINGS.  Left nephrolithiasis.   No hydronephrosis or 

hydroureter..


 














Qualifiers





- *


PATIENT BEING DISCHARGED WITH ANY OF THE FOLLOWING DIAGNOSIS: No


VTE patient discharged on overlapping Therapy?: Yes





Acute Heart Failure





- **


Is this a Heart Failure Patient?: No

## 2019-09-25 ENCOUNTER — HOSPITAL ENCOUNTER (EMERGENCY)
Dept: HOSPITAL 62 - ER | Age: 39
Discharge: HOME | End: 2019-09-25
Payer: MEDICAID

## 2019-09-25 VITALS — DIASTOLIC BLOOD PRESSURE: 62 MMHG | SYSTOLIC BLOOD PRESSURE: 105 MMHG

## 2019-09-25 DIAGNOSIS — F17.200: ICD-10-CM

## 2019-09-25 DIAGNOSIS — R21: Primary | ICD-10-CM

## 2019-09-25 PROCEDURE — 99282 EMERGENCY DEPT VISIT SF MDM: CPT

## 2019-09-25 PROCEDURE — 96372 THER/PROPH/DIAG INJ SC/IM: CPT

## 2019-09-25 NOTE — ER DOCUMENT REPORT
HPI





- HPI


Time Seen by Provider: 09/25/19 03:49


Pain Level: 0


Context: 





Patient is a 39-year-old female that comes to the emergency department for chief

complaint of an itchy rash that started on her legs and feet, has now developed 

on her chest and arms and neck.  She states rash is very itchy and is driving 

her crazy.  She denies difficulty breathing or swallowing, fevers chills, or any

other complaints at this time.  She denies history of the same.  She did recen

tly get off antibiotics after being treated for urinary tract infection.





- REPRODUCTIVE


LMP: now


Reproductive: DENIES: Pregnant:





Past Medical History





- General


Information source: Patient





- Social History


Smoking Status: Current Every Day Smoker


Chew tobacco use (# tins/day): No


Frequency of alcohol use: Occasional


Drug Abuse: None


Lives with: Family


Family History: CVA, DM, Hypertension, Malignancy, Thyroid Disfunction, Other - 

Heart disease, alcoholism


Patient has suicidal ideation: No


Patient has homicidal ideation: No


Renal/ Medical History: Reports: Hx Kidney Stones


Musculoskeletal Medical History: Denies Hx Fibromyalgia, Denies Hx Gout, Reports

Hx Musculoskeletal Trauma


Traumatic Medical History: Reports: Hx Fractures - right arm left leg


Past Surgical History: Reports: Hx Kidney (Renal Surgery) - adrenal gland 

removal left, Other - Right adrenalectomy





- Immunizations


Immunizations up to date: Yes


Hx Diphtheria, Pertussis, Tetanus Vaccination: Yes - less 5 yrs





Vertical Provider Document





- CONSTITUTIONAL


General Appearance: WD/WN, No Apparent Distress





- INFECTION CONTROL


TRAVEL OUTSIDE OF THE [place] IN LAST 30 DAYS: No





- HEENT


HEENT: Atraumatic, Normal ENT Exam - Normal tongue, normal lips, normal eyes and

orbits, patent airway, Normocephalic





- NECK


Neck: Normal Inspection





- RESPIRATORY


Respiratory: Breath Sounds Normal, No Respiratory Distress





- CARDIOVASCULAR


Cardiovascular: Regular Rate, Regular Rhythm





- GI/ABDOMEN


Gastrointestinal: Abdomen Soft, Abdomen Non-Tender





- REPRODUCTIVE


Female Genitalia: Normal Inspection, Abnormal Inspection





- BACK


Back: Normal Inspection





- MUSCULOSKELETAL/EXTREMETIES


Musculoskeletal/Extremeties: MAEW, FROM, Non-Tender





- NEURO


Level of Consciousness: Awake, Alert, Appropriate


Motor/Sensory: No Motor Deficit, No Sensory Deficit


Deep Tendon Reflexes: Absent





- DERM


Integumentary: Warm, Dry, No Rash





Course





- Re-evaluation


Re-evalutation: 


Patient with scattered, splotchy, slightly raised rash.  Appears maculopapular, 

not overt hives.  Patient clearly demonstrates that this is pruritic.  There is 

no sign of secondary infection noted, no signs of allergic reaction, and the 

area is diffuse.  Suspect immune component but this is nonspecific.  Discussed 

options with patient, placing on antihistamine and steroid, discussed 

expectations, follow-up, and return precautions.  Patient states understanding 

and agreement with plan.








- Vital Signs


Vital signs: 


                                        











Temp Pulse Resp BP Pulse Ox


 


 98.4 F   90   16   105/62   98 


 


 09/25/19 02:42  09/25/19 02:42  09/25/19 02:42  09/25/19 02:42  09/25/19 02:42














Discharge





- Discharge


Clinical Impression: 


 Rash and nonspecific skin eruption





Condition: Stable


Disposition: HOME, SELF-CARE


Additional Instructions: 


The rash is nonspecific but does appear to be immune related.  This does not 

appear to be infectious in nature.  As result I do recommend the steroid as 

prescribed in the cetirizine daily for the next week.  You can take Benadryl in 

addition to this if needed to help you sleep.





Follow-up with primary care.  Return if you worsen including spreading redness, 

developing pain or swelling, fever/chills, or any other concerning symptoms.


Prescriptions: 


Cetirizine HCl [All Day Allergy] 10 mg PO DAILY #30 capsule


Prednisone [Deltasone 10 mg Tablet] 10 mg PO ASDIR PRN #21 tablet


 PRN Reason: 


Forms:  Return to Work

## 2020-06-17 ENCOUNTER — HOSPITAL ENCOUNTER (EMERGENCY)
Dept: HOSPITAL 62 - ER | Age: 40
Discharge: HOME | End: 2020-06-17
Payer: MEDICAID

## 2020-06-17 VITALS — SYSTOLIC BLOOD PRESSURE: 122 MMHG | DIASTOLIC BLOOD PRESSURE: 62 MMHG

## 2020-06-17 DIAGNOSIS — N20.0: Primary | ICD-10-CM

## 2020-06-17 DIAGNOSIS — Z87.442: ICD-10-CM

## 2020-06-17 DIAGNOSIS — R10.9: ICD-10-CM

## 2020-06-17 LAB
ADD MANUAL DIFF: NO
ALBUMIN SERPL-MCNC: 3.8 G/DL (ref 3.5–5)
ALP SERPL-CCNC: 84 U/L (ref 38–126)
ANION GAP SERPL CALC-SCNC: 7 MMOL/L (ref 5–19)
APPEARANCE UR: (no result)
APTT PPP: YELLOW S
AST SERPL-CCNC: 15 U/L (ref 14–36)
BARBITURATES UR QL SCN: NEGATIVE
BASOPHILS # BLD AUTO: 0 10^3/UL (ref 0–0.2)
BASOPHILS NFR BLD AUTO: 0.3 % (ref 0–2)
BILIRUB DIRECT SERPL-MCNC: 0 MG/DL (ref 0–0.4)
BILIRUB SERPL-MCNC: 0.3 MG/DL (ref 0.2–1.3)
BILIRUB UR QL STRIP: NEGATIVE
BUN SERPL-MCNC: 16 MG/DL (ref 7–20)
CALCIUM: 9.5 MG/DL (ref 8.4–10.2)
CHLORIDE SERPL-SCNC: 100 MMOL/L (ref 98–107)
CO2 SERPL-SCNC: 29 MMOL/L (ref 22–30)
EOSINOPHIL # BLD AUTO: 0.1 10^3/UL (ref 0–0.6)
EOSINOPHIL NFR BLD AUTO: 0.7 % (ref 0–6)
ERYTHROCYTE [DISTWIDTH] IN BLOOD BY AUTOMATED COUNT: 13.3 % (ref 11.5–14)
GLUCOSE SERPL-MCNC: 102 MG/DL (ref 75–110)
GLUCOSE UR STRIP-MCNC: NEGATIVE MG/DL
HCT VFR BLD CALC: 38.2 % (ref 36–47)
HGB BLD-MCNC: 12.9 G/DL (ref 12–15.5)
KETONES UR STRIP-MCNC: NEGATIVE MG/DL
LYMPHOCYTES # BLD AUTO: 1.2 10^3/UL (ref 0.5–4.7)
LYMPHOCYTES NFR BLD AUTO: 8.6 % (ref 13–45)
MCH RBC QN AUTO: 30.4 PG (ref 27–33.4)
MCHC RBC AUTO-ENTMCNC: 33.7 G/DL (ref 32–36)
MCV RBC AUTO: 90 FL (ref 80–97)
METHADONE UR QL SCN: NEGATIVE
MONOCYTES # BLD AUTO: 1.4 10^3/UL (ref 0.1–1.4)
MONOCYTES NFR BLD AUTO: 9.8 % (ref 3–13)
NEUTROPHILS # BLD AUTO: 11.6 10^3/UL (ref 1.7–8.2)
NEUTS SEG NFR BLD AUTO: 80.6 % (ref 42–78)
PCP UR QL SCN: NEGATIVE
PH UR STRIP: 8 [PH] (ref 5–9)
PLATELET # BLD: 276 10^3/UL (ref 150–450)
POTASSIUM SERPL-SCNC: 4.2 MMOL/L (ref 3.6–5)
PROT SERPL-MCNC: 6.6 G/DL (ref 6.3–8.2)
PROT UR STRIP-MCNC: NEGATIVE MG/DL
RBC # BLD AUTO: 4.23 10^6/UL (ref 3.72–5.28)
SP GR UR STRIP: 1.01
TOTAL CELLS COUNTED % (AUTO): 100 %
URINE AMPHETAMINES SCREEN: NEGATIVE
URINE BENZODIAZEPINES SCREEN: NEGATIVE
URINE COCAINE SCREEN: NEGATIVE
URINE MARIJUANA (THC) SCREEN: NEGATIVE
UROBILINOGEN UR-MCNC: NEGATIVE MG/DL (ref ?–2)
WBC # BLD AUTO: 14.5 10^3/UL (ref 4–10.5)

## 2020-06-17 PROCEDURE — 96375 TX/PRO/DX INJ NEW DRUG ADDON: CPT

## 2020-06-17 PROCEDURE — 85025 COMPLETE CBC W/AUTO DIFF WBC: CPT

## 2020-06-17 PROCEDURE — 80307 DRUG TEST PRSMV CHEM ANLYZR: CPT

## 2020-06-17 PROCEDURE — 84703 CHORIONIC GONADOTROPIN ASSAY: CPT

## 2020-06-17 PROCEDURE — 96374 THER/PROPH/DIAG INJ IV PUSH: CPT

## 2020-06-17 PROCEDURE — 99284 EMERGENCY DEPT VISIT MOD MDM: CPT

## 2020-06-17 PROCEDURE — 80053 COMPREHEN METABOLIC PANEL: CPT

## 2020-06-17 PROCEDURE — 81001 URINALYSIS AUTO W/SCOPE: CPT

## 2020-06-17 PROCEDURE — 74176 CT ABD & PELVIS W/O CONTRAST: CPT

## 2020-06-17 PROCEDURE — 36415 COLL VENOUS BLD VENIPUNCTURE: CPT

## 2020-06-17 PROCEDURE — 96361 HYDRATE IV INFUSION ADD-ON: CPT

## 2020-06-17 NOTE — RADIOLOGY REPORT (SQ)
CT abdomen and pelvis without contrast on 6/17/2020 7:31 AM 



CLINICAL INDICATION: Left-sided back pain, history of kidney

stones



TECHNIQUE: Multiple axial images are obtained throughout the

abdomen and pelvis without the administration of contrast. This

exam was performed according to our departmental

dose-optimization program, which includes automated exposure

control, adjustment of the mA and/or kV according to patient size

and/or use of iterative reconstruction technique.

Total DLP is 851.87 mGy*cm.



COMPARISON: 9/9/2019



FINDINGS:

Abdomen: The lung bases are clear. There is mild fatty

infiltration of the liver. There are nonobstructing left renal

stones with the largest stone measuring 1 cm in greatest

diameter. Cyst is again noted in the lower pole of the left

kidney. There are no ureteral stones. There are couple of tiny

nonobstructing right renal stones. The unenhanced solid abdominal

organs are otherwise unremarkable. There is no abdominal

adenopathy. There is no free fluid or free air within the

abdomen. There is diverticulosis. The abdominal portion of the GI

tract is otherwise unremarkable.



Pelvis: Pelvic organs appear unremarkable by CT. No free fluid is

noted in the pelvis. There is no pelvic adenopathy. There is

diverticulosis. The pelvic portion of the GI tract including the

appendix is otherwise unremarkable. No bony abnormality is noted.



IMPRESSION:

1. Left greater than right nephrolithiasis with no ureteral

stones and no hydronephrosis.

2. Diverticulosis.

3. Mild fatty infiltration of the liver.

## 2020-06-17 NOTE — ER DOCUMENT REPORT
ED General





- General


Chief Complaint: Flank Pain


Stated Complaint: LEFT FLANK PAIN


Time Seen by Provider: 06/17/20 06:34


TRAVEL OUTSIDE OF THE [place] IN LAST 30 DAYS: No





- HPI


Notes: 





Chief complaint: Left flank pain and nausea with past history of kidney stones





History of present illness: 40-year-old female with prior history of 

nephrolithiasis not being regularly followed by urologist reports intermittent 

left flank pain for several weeks.  This was worse overnight and associated with

nausea but no vomiting.  No fever chills or dysuria.  Pain originates in the 

left flank and radiates to left lower quadrant of abdomen.  She took some 

ibuprofen at home with no relief her discomfort which she reports presently has 

5/10 intensity.  Patient has never required any surgical intervention for 

stones.  She was hospitalized here 1 year ago after an episode of renal colic 

when she had an associated pyelonephritis.  She is not being regularly followed 

by urologist.  She takes no regular prescription medications.  Last menses 2 

weeks ago "normal".  No prior surgery.





- Related Data


Allergies/Adverse Reactions: 


                                        





cinnamon [Cinnamon] Allergy (Unknown, Verified 09/09/19 09:08)


   











Past Medical History





- General


Information source: Patient, [facility] Records





- Social History


Smoking Status: Never Smoker


Frequency of alcohol use: Social


Drug Abuse: None


Occupation: 


Lives with: Family


Family History: CVA, DM, Hypertension, Malignancy, Thyroid Disfunction, Other - 

Heart disease, alcoholism


Patient has homicidal ideation: No


Renal/ Medical History: Reports: Hx Kidney Stones, Other - Urinary tract 

infection


Musculoskeletal Medical History: Denies Hx Fibromyalgia, Denies Hx Gout, Reports

 Hx Musculoskeletal Trauma


Traumatic Medical History: Reports: Hx Fractures - right arm left leg


Past Surgical History: Reports: Hx Kidney (Renal Surgery) - adrenal gland 

removal left, Other - Right adrenalectomy





- Immunizations


Immunizations up to date: Yes


Hx Diphtheria, Pertussis, Tetanus Vaccination: Yes - less 5 yrs





Review of Systems





- Review of Systems


Notes: 





Constitutional: Negative for fever.


HENT: Hoarse with sore throat past 2 days.


Eyes: Negative for visual changes.


Cardiovascular: Negative for chest pain.


Respiratory: Negative for shortness of breath.


Gastrointestinal: As per HPI.


Genitourinary: As per HPI.


Musculoskeletal: As per HPI.


Skin: Negative for rash.


Neurological: Negative for headaches, weakness or numbness.





10 point ROS negative except as marked above and in HPI.








Physical Exam





- Vital signs


Vitals: 


                                        











Temp Pulse Resp BP Pulse Ox


 


 99.6 F   88   12   130/60 H  96 


 


 06/17/20 06:34  06/17/20 06:34  06/17/20 06:34  06/17/20 06:34  06/17/20 06:34














- Notes


Notes: 











GENERAL: Well-developed well-nourished appearing in no acute distress.





SKIN: Good turgor no rashes.





HEAD: Normocephalic atraumatic.





EYES: PERRLA.  EOMI.  Conjunctivae and sclerae clear.





EARS: CANALS AND TMS CLEAR.





NOSE: CLEAR.





MOUTH: Moist mucosa.  Good dentition.  No stridor or edema.  No drooling.





NECK: Supple.  No masses or thyromegaly.  No adenopathy.  Carotids 2+ without b

ruits.  No JVD.





BACK: Minimal left CVA tenderness.  Symmetrical without tenderness.





CHEST: Respirations unlabored.  Breath sounds clear and symmetrical.





HEART: Regular rhythm.  No murmur gallop or rub.





ABDOMEN: Soft nontender without masses, organomegaly or rebound.  Bowel sounds 

normally active.  No bruits.





GENITALIA: Deferred.





EXTREMITIES: No edema.  No calf tenderness.  Cap refill less than 1.5 seconds.  

Dorsalis pedis and posterior tibial pulses 3+ and symmetrical.





NEUROLOGICAL: GCS 15.  Alert and oriented x3.  Normal gait.  Fluent speech.  

Cranial nerves II through XII intact.  Sensorimotor and cerebellar normal.  

Normal tone.





PSYCHIATRIC: Appropriate affect.





Course





- Re-evaluation


Re-evalutation: 





06/17/20 09:04


Patient was given IV normal saline and IV Toradol.  She reported no improvement 

in her symptoms.  I subsequently gave her 1 dose of morphine here.  She has no 

fever although her white count is mildly elevated.  Her urinalysis showed a 

large number of squamous epithelial with just a few WBCs present.  I will 

culture the urine and I am going to go ahead and empirically put her on some 

Keflex pending culture result because of the stones that are present in the 

flank tenderness that she had.  I will refer her to a urologist and will give 

her some tramadol at home for pain.  Her urine drug screen here was negative.





- Vital Signs


Vital signs: 


                                        











Temp Pulse Resp BP Pulse Ox


 


 99.6 F   88   12   130/60 H  96 


 


 06/17/20 06:35  06/17/20 06:34  06/17/20 06:34  06/17/20 06:34  06/17/20 06:34














- Laboratory


Result Diagrams: 


                                 06/17/20 06:30





                                 06/17/20 06:30


Laboratory results interpreted by me: 


                                        











  06/17/20 06/17/20 06/17/20





  06:30 06:30 08:25


 


WBC  14.5 H  


 


Lymph % (Auto)  8.6 L  


 


Absolute Neuts (auto)  11.6 H  


 


Seg Neutrophils %  80.6 H  


 


Sodium   135.9 L 


 


Leukocyte Esterase Rfl    TRACE H














- Diagnostic Test


Radiology reviewed: Reports reviewed - Noncontrast CT abdomen pelvis per 

radiologist: 2 large stones intrarenal on the left with no obstructing stones of

 the ureter or hydronephrosis.





Discharge





- Discharge


Clinical Impression: 


 Left flank pain, Nephrolithiasis





Condition: Stable


Disposition: HOME, SELF-CARE


Additional Instructions: 


Kidney Stone





     You are passing or have passed a kidney stone.  These stones are usually 

due to increased calcium or uric acid concentrations in your urine.  Stones 

within the kidney itself are not painful.  The pain occurs as the stone leaves 

the kidney to pass down the long tube, called the ureter, leading to the 

bladder.


     If the stone is small, it will usually pass by itself.  Most patients can 

pass the stone at home.  You will usually receive medications for pain, nausea 

or vomiting, and sometimes a medication to assist in passing the kidney stone.  

However, if the pain is very severe or if vomiting prevents you from taking oral

 pain medications, you may need to return for further treatment.


     Drink three or four quarts of fluids per day.  You will be given pain 

medication (if needed) and urine strainers.  Strain all your urine to see if the

 stone passes.  If your doctor has asked you to bring the stone in for analysis,

 return with the stone once it has passed.


     Return if pain or vomiting become severe, if you develop a high fever, if 

you are unable to pass your urine, or if other unusual symptoms occur.





Follow-up with referral urologist.





Take prescribed medication as instructed/as needed.





Increase oral fluids.





You will be provided a work note for the next 3 days.





Return here as needed for new or worsening symptoms:





Pain that is worsening or unimproved


Uncontrolled vomiting


High fever or shaking chills


Overall worsening


Prescriptions: 


Tramadol HCl [Ultram 50 mg Tablet] 50 mg PO Q4HP PRN #12 tab


 PRN Reason: 


Cephalexin Monohydrate [Keflex 500 mg Capsule] 500 mg PO Q6H 10 Days #40 capsule


Forms:  Return to Work